# Patient Record
Sex: MALE | Race: WHITE | NOT HISPANIC OR LATINO
[De-identification: names, ages, dates, MRNs, and addresses within clinical notes are randomized per-mention and may not be internally consistent; named-entity substitution may affect disease eponyms.]

---

## 2020-09-23 PROBLEM — Z00.00 ENCOUNTER FOR PREVENTIVE HEALTH EXAMINATION: Status: ACTIVE | Noted: 2020-09-23

## 2020-10-14 ENCOUNTER — APPOINTMENT (OUTPATIENT)
Dept: GASTROENTEROLOGY | Facility: CLINIC | Age: 50
End: 2020-10-14
Payer: COMMERCIAL

## 2020-10-14 VITALS
BODY MASS INDEX: 25.92 KG/M2 | TEMPERATURE: 98 F | DIASTOLIC BLOOD PRESSURE: 89 MMHG | SYSTOLIC BLOOD PRESSURE: 127 MMHG | HEART RATE: 83 BPM | OXYGEN SATURATION: 98 % | WEIGHT: 175 LBS | HEIGHT: 69 IN

## 2020-10-14 DIAGNOSIS — Z82.3 FAMILY HISTORY OF STROKE: ICD-10-CM

## 2020-10-14 DIAGNOSIS — Z80.8 FAMILY HISTORY OF MALIGNANT NEOPLASM OF OTHER ORGANS OR SYSTEMS: ICD-10-CM

## 2020-10-14 DIAGNOSIS — A63.0 ANOGENITAL (VENEREAL) WARTS: ICD-10-CM

## 2020-10-14 DIAGNOSIS — Z12.11 ENCOUNTER FOR SCREENING FOR MALIGNANT NEOPLASM OF COLON: ICD-10-CM

## 2020-10-14 DIAGNOSIS — Z82.49 FAMILY HISTORY OF ISCHEMIC HEART DISEASE AND OTHER DISEASES OF THE CIRCULATORY SYSTEM: ICD-10-CM

## 2020-10-14 DIAGNOSIS — Z78.9 OTHER SPECIFIED HEALTH STATUS: ICD-10-CM

## 2020-10-14 DIAGNOSIS — F17.200 NICOTINE DEPENDENCE, UNSPECIFIED, UNCOMPLICATED: ICD-10-CM

## 2020-10-14 DIAGNOSIS — Z80.3 FAMILY HISTORY OF MALIGNANT NEOPLASM OF BREAST: ICD-10-CM

## 2020-10-14 PROCEDURE — 99203 OFFICE O/P NEW LOW 30 MIN: CPT

## 2020-10-14 NOTE — PHYSICAL EXAM
[General Appearance - Alert] : alert [Sclera] : the sclera and conjunctiva were normal [General Appearance - Well-Appearing] : healthy appearing [General Appearance - Well Nourished] : well nourished [Neck Appearance] : the appearance of the neck was normal [Outer Ear] : the ears and nose were normal in appearance [] : no respiratory distress [Abdomen Tenderness] : non-tender [Abdomen Mass (___ Cm)] : no abdominal mass palpated [No Focal Deficits] : no focal deficits [Involuntary Movements] : no involuntary movements were seen [Skin Color & Pigmentation] : normal skin color and pigmentation [Oriented To Time, Place, And Person] : oriented to person, place, and time

## 2020-10-14 NOTE — ASSESSMENT
[FreeTextEntry1] : 49 yo male with a hx of anal warts s/p tx here for a screening colonoscopy\par \par - Colonoscopy at [facility]\par - D/w pt regarding COVID testing pre-procedure as well as escort post-procedure\par - Risks of the procedure including bleeding, perforation, etc d/w the patient\par - Suprep split prep\par - Will refer to Dr. Green for anal pap evaluation

## 2020-10-14 NOTE — HISTORY OF PRESENT ILLNESS
[FreeTextEntry1] : 49 yo male here to arrange a screening colonoscopy.  No BRBPR, no dark stools.  No N/V.  Takes 2 teaspoons of Metamucil with four ounces of water daily to prevent diarrhea.  No constipation.  No heartburn or reflux -- takes a teaspoon of baking soda with four ounces of water very rarely to treat if symptoms.   No hx of an EGD or a colonoscopy.  Hx of anal warts s/p tx with a cream (Efudex) which eradicated them 25 years ago, no f/u since -- Dr. Thayer rx'ed of colorectal;. \par \par No famhx of gastric, colon or pancreatic cancer.  No IBD.  \par \par .

## 2020-12-08 DIAGNOSIS — Z01.812 ENCOUNTER FOR PREPROCEDURAL LABORATORY EXAMINATION: ICD-10-CM

## 2020-12-14 ENCOUNTER — RESULT REVIEW (OUTPATIENT)
Age: 50
End: 2020-12-14

## 2020-12-14 ENCOUNTER — OUTPATIENT (OUTPATIENT)
Dept: OUTPATIENT SERVICES | Facility: HOSPITAL | Age: 50
LOS: 1 days | Discharge: HOME | End: 2020-12-14
Payer: COMMERCIAL

## 2020-12-14 ENCOUNTER — APPOINTMENT (OUTPATIENT)
Dept: GASTROENTEROLOGY | Facility: HOSPITAL | Age: 50
End: 2020-12-14

## 2020-12-14 LAB — SARS-COV-2 N GENE NPH QL NAA+PROBE: NOT DETECTED

## 2020-12-14 PROCEDURE — 88305 TISSUE EXAM BY PATHOLOGIST: CPT

## 2020-12-14 PROCEDURE — 45380 COLONOSCOPY AND BIOPSY: CPT | Mod: GC

## 2020-12-14 PROCEDURE — 88305 TISSUE EXAM BY PATHOLOGIST: CPT | Mod: 26

## 2020-12-14 PROCEDURE — 45380 COLONOSCOPY AND BIOPSY: CPT | Mod: PT

## 2020-12-16 LAB — SURGICAL PATHOLOGY STUDY: SIGNIFICANT CHANGE UP

## 2022-03-18 ENCOUNTER — APPOINTMENT (OUTPATIENT)
Dept: OTOLARYNGOLOGY | Facility: CLINIC | Age: 52
End: 2022-03-18
Payer: COMMERCIAL

## 2022-03-18 ENCOUNTER — NON-APPOINTMENT (OUTPATIENT)
Age: 52
End: 2022-03-18

## 2022-03-18 VITALS — TEMPERATURE: 98.3 F | BODY MASS INDEX: 26.66 KG/M2 | WEIGHT: 180 LBS | HEIGHT: 69 IN

## 2022-03-18 DIAGNOSIS — H93.291 OTHER ABNORMAL AUDITORY PERCEPTIONS, RIGHT EAR: ICD-10-CM

## 2022-03-18 PROCEDURE — 95992 CANALITH REPOSITIONING PROC: CPT

## 2022-03-18 PROCEDURE — 99203 OFFICE O/P NEW LOW 30 MIN: CPT | Mod: 25

## 2022-03-18 NOTE — HISTORY OF PRESENT ILLNESS
[de-identified] : SE ESCOBAR is a 51 year old patient With a several month history of a "gurgling" noise in the right ear.  There was no preceding event.  He has no otalgia, otorrhea, or tinnitus (high-pitched or pulsatile).  He does not have eustachian tube dysfunction.  He has no nasal or throat symptoms.  He also had 2 episodes of dizziness.  He had one on Tuesday and then again Wednesday or Thursday.  He was in bed and turned.  He had a several second episode of a spinning sensation.  He has not been sleeping well.  He does have a history of migraines and neck pain.  Has been sleeping more on his back which has helped with the neck pain.  It does make him snore.  He denies TMJ dysfunction.  He does have occasional reflux.  He has been under a lot of stress recently.\par \par No history of recurrent ear infections, prior otologic surgery, or ear trauma.\par There is a family history of hearing loss and vertigo.\par He does have a history of noise exposure.  He is an  at [employer].\par He has annual audiograms.  His last one was a few months ago.\par

## 2022-03-18 NOTE — CONSULT LETTER
[Dear  ___] : Dear  [unfilled], [Consult Letter:] : I had the pleasure of evaluating your patient, [unfilled]. [Please see my note below.] : Please see my note below. [Consult Closing:] : Thank you very much for allowing me to participate in the care of this patient.  If you have any questions, please do not hesitate to contact me. [Sincerely,] : Sincerely, [FreeTextEntry3] : Alexandrea Ying MD\par

## 2022-03-18 NOTE — ASSESSMENT
[FreeTextEntry1] : He has what he describes as a gurgling noise in the right ear.  He denies tinnitus.  His ears were normal on exam.  He had a few stray hairs on the right side which were removed.  He is also had 2 episodes of dizziness.  He may have benign positional vertigo.  Chanel-Hallpike testing to the left was mildly positive.  Epley maneuver was performed\par \par PLAN\par \par -findings and management options discussed in detail with the patient. \par -good aural hygiene\par -avoid using cotton swabs in the ears\par -wax removal drops as needed. \par -noise precautions\par -I recommended an audiogram.  He declined.  I asked him to have a copy of his last one sent to me\par -He was given post Epley instructions.  If he has recurrent dizziness, he may try home exercises and consider vestibular therapy.  \par -We will discuss further work-up depending on how he does.\par -He is going to try and work on getting more sleep\par -follow up or call in 2 weeks with an update on his symptoms.\par -call and return earlier if any concerns.

## 2022-04-05 ENCOUNTER — APPOINTMENT (OUTPATIENT)
Dept: OTOLARYNGOLOGY | Facility: CLINIC | Age: 52
End: 2022-04-05
Payer: COMMERCIAL

## 2022-04-05 DIAGNOSIS — H90.5 UNSPECIFIED SENSORINEURAL HEARING LOSS: ICD-10-CM

## 2022-04-05 DIAGNOSIS — R42 DIZZINESS AND GIDDINESS: ICD-10-CM

## 2022-04-05 PROCEDURE — 99213 OFFICE O/P EST LOW 20 MIN: CPT

## 2022-04-05 NOTE — CONSULT LETTER
[Dear  ___] : Dear  [unfilled], [Courtesy Letter:] : I had the pleasure of seeing your patient, [unfilled], in my office today. [Please see my note below.] : Please see my note below. [Consult Closing:] : Thank you very much for allowing me to participate in the care of this patient.  If you have any questions, please do not hesitate to contact me. [Sincerely,] : Sincerely, [FreeTextEntry3] : Alexandrea Ying MD\par

## 2022-04-05 NOTE — ASSESSMENT
[FreeTextEntry1] : He is feeling better.  The dizziness has improved significantly.  I reviewed his prior audiograms.  He has an asymmetric left sensorineural hearing loss in the high frequencies.\par \par PLAN\par \par -findings and management options discussed in detail with the patient. \par -good aural hygiene\par -avoid using cotton swabs in the ears\par -wax removal drops as needed. \par -noise precautions\par -Annual audiogram\par -I discussed the possibility of retrocochlear pathology causing the asymmetric hearing loss.  He also has dizziness and migraines..  The options are observation with serial audiograms, ABR, and MRI to rule out retrocochlear pathology. The patient has opted for MRI\par -Home exercises or vestibular therapy as needed for dizziness\par -Neurology evaluation recommended for the neck pain and headaches\par -Follow-up or call for the MRI results..\par -call and return earlier if any concerns.

## 2022-04-05 NOTE — HISTORY OF PRESENT ILLNESS
[de-identified] : SE ESCOBAR is a 51 year old patient here for follow-up for a gurgling noise in the right ear and vertigo.  He feels a lot better..  The noise in the right ear has resolved.  The dizziness is also a lot better.  The Epley maneuver was performed at his last visit.  He has very light sensation of dizziness which is less frequent.  He has no other complaints.  He also has a history of neck pain which is better.  He brought his last 3 audiograms for me to review.  He has an asymmetric left sensorineural hearing loss.  He said it has been present for a while.  He has not had a work-up for it\par \par ENT history\par No history of recurrent ear infections, prior otologic surgery, or ear trauma.\par There is a family history of hearing loss and vertigo.\par He does have a history of noise exposure. He is an  at [employer].\par He has annual audiograms. His last one was a few months ago.\par He has chronic neck pain and migraines

## 2022-04-26 ENCOUNTER — OUTPATIENT (OUTPATIENT)
Dept: OUTPATIENT SERVICES | Facility: HOSPITAL | Age: 52
LOS: 1 days | End: 2022-04-26

## 2022-04-26 ENCOUNTER — APPOINTMENT (OUTPATIENT)
Dept: MRI IMAGING | Facility: CLINIC | Age: 52
End: 2022-04-26
Payer: COMMERCIAL

## 2022-04-26 PROCEDURE — 70553 MRI BRAIN STEM W/O & W/DYE: CPT | Mod: 26

## 2022-04-27 ENCOUNTER — NON-APPOINTMENT (OUTPATIENT)
Age: 52
End: 2022-04-27

## 2022-05-11 ENCOUNTER — NON-APPOINTMENT (OUTPATIENT)
Age: 52
End: 2022-05-11

## 2022-05-11 ENCOUNTER — APPOINTMENT (OUTPATIENT)
Dept: NEUROLOGY | Facility: CLINIC | Age: 52
End: 2022-05-11
Payer: COMMERCIAL

## 2022-05-11 VITALS
SYSTOLIC BLOOD PRESSURE: 112 MMHG | DIASTOLIC BLOOD PRESSURE: 72 MMHG | BODY MASS INDEX: 26.36 KG/M2 | WEIGHT: 178 LBS | TEMPERATURE: 97.2 F | HEART RATE: 66 BPM | HEIGHT: 69 IN | OXYGEN SATURATION: 97 %

## 2022-05-11 PROCEDURE — 99244 OFF/OP CNSLTJ NEW/EST MOD 40: CPT

## 2022-05-11 NOTE — ASSESSMENT
[FreeTextEntry1] : Patient has longstanding migraines with a strong family history of migraines as well and as is usually seen has stopped benefiting from over-the-counter medications.  He has never tried either a triptan or CGRP rescue medication.  I would like to start him on Nurtec for headaches as rescue medication.  At this point I am deferring prophylactic medication to see if effective and rapid treatment of his migraines reduces his frequency of headaches from 4-10 to under 4/month.  If that occurs we can keep him on only a rescue regimen.\par I have also asked him to keep a headache diary noting the frequency of headaches and the response to the Nurtec.\par \par follow up in 3 months.

## 2022-05-11 NOTE — CONSULT LETTER
[Dear  ___] : Dear ~FABRICIO, [Consult Letter:] : I had the pleasure of evaluating your patient, [unfilled]. [Please see my note below.] : Please see my note below. [Consult Closing:] : Thank you very much for allowing me to participate in the care of this patient.  If you have any questions, please do not hesitate to contact me. [Sincerely,] : Sincerely, [FreeTextEntry3] : Rut Pfeiffer MD\par Board Certified Neurology and Vascular Neurology\par Professor of Neurology\par [facility]\par [facility]\par [facility]\par \par [email or web address]\par [phone]\par

## 2022-05-11 NOTE — HISTORY OF PRESENT ILLNESS
[FreeTextEntry1] : Mr. Amos Antonio is referred today by Dr. Julio Hansen for evaluation and treatment of chronic migraine headaches.\par \par He first started having headaches in his late teens and while the headaches have fluctuated in frequency and intensity over the last 3+ decades and have remained persistent and severe in nature.  He has a strong family history with his mother and 1 sister experiencing severe migraines throughout the life.  He rarely gets an aura with visual scotomata that can occur with or without headache.  Amos reports that he has not been able  to discern specific food triggers in the past.  He does report that if he has neck pain it is often followed by a migraine and when he drinks either beer or wine, he has a migraine the next day.  He used to drink more frequently and to a greater degree in the past but now restricts it to 1 weekend day a week.  On those days, he tends to drink a sixpack of beer and/or a bottle of red wine.  The specific type of alcohol does not seem to matter vis-à-vis his triggers.  He does not drink coffee because it makes him jittery, but will have 1 cup of tea in the morning and that does not impact his headaches.  Finally stress at work and in his personal relationship were significant triggers that have subsequently improved in the last few months.\par \par As far as treatment, he used to take Tylenol or Advil and then more recently has been taking Excedrin tension.  Often he requires more than 1 dose every 6 hours before the headache resolves.  Headache duration can be anywhere from 1 hour to 12 hours.\par \sona Recently had an episode of vertigo that was resolved by an Epley maneuver by Dr. Ying.  An MRI IAC was also done that is negative for schwannoma, other mass lesions, inflammatory lesions, and strokes.  Of note, he does not usually get vertiginous with his migraines.\par \par Finally it appears that most of the neck pain occurs in the setting right before a migraine.\par \par \par Exercise and diet:\sona walks in the park usually 30 min a week. \sona joined a gym recently but has not gone yet. \sona bakes his own bread and mainly eats at home.\par \par Vapes tobacco. \par \par \par FH - mother and his oldest sister have migraines\par \par \par \par

## 2022-08-12 ENCOUNTER — APPOINTMENT (OUTPATIENT)
Dept: NEUROLOGY | Facility: CLINIC | Age: 52
End: 2022-08-12

## 2022-09-13 ENCOUNTER — RX RENEWAL (OUTPATIENT)
Age: 52
End: 2022-09-13

## 2022-09-26 ENCOUNTER — INPATIENT (INPATIENT)
Facility: HOSPITAL | Age: 52
LOS: 6 days | Discharge: HOME | End: 2022-10-03
Attending: SURGERY | Admitting: SURGERY

## 2022-09-26 VITALS
TEMPERATURE: 97 F | DIASTOLIC BLOOD PRESSURE: 76 MMHG | OXYGEN SATURATION: 99 % | RESPIRATION RATE: 18 BRPM | HEIGHT: 69 IN | HEART RATE: 89 BPM | WEIGHT: 175.05 LBS | SYSTOLIC BLOOD PRESSURE: 124 MMHG

## 2022-09-26 PROCEDURE — 99285 EMERGENCY DEPT VISIT HI MDM: CPT

## 2022-09-26 NOTE — ED ADULT TRIAGE NOTE - CHIEF COMPLAINT QUOTE
Patient complaining of testicular and anus soreness starting 3days go. Last BM this evening. Patient also reports lower abdominal pressure and tightness.

## 2022-09-27 ENCOUNTER — TRANSCRIPTION ENCOUNTER (OUTPATIENT)
Age: 52
End: 2022-09-27

## 2022-09-27 ENCOUNTER — RESULT REVIEW (OUTPATIENT)
Age: 52
End: 2022-09-27

## 2022-09-27 LAB
ALBUMIN SERPL ELPH-MCNC: 4.3 G/DL — SIGNIFICANT CHANGE UP (ref 3.5–5.2)
ALP SERPL-CCNC: 69 U/L — SIGNIFICANT CHANGE UP (ref 30–115)
ALT FLD-CCNC: 25 U/L — SIGNIFICANT CHANGE UP (ref 0–41)
ANION GAP SERPL CALC-SCNC: 15 MMOL/L — HIGH (ref 7–14)
APTT BLD: 29.6 SEC — SIGNIFICANT CHANGE UP (ref 27–39.2)
AST SERPL-CCNC: 15 U/L — SIGNIFICANT CHANGE UP (ref 0–41)
BASOPHILS # BLD AUTO: 0 K/UL — SIGNIFICANT CHANGE UP (ref 0–0.2)
BASOPHILS NFR BLD AUTO: 0 % — SIGNIFICANT CHANGE UP (ref 0–1)
BILIRUB DIRECT SERPL-MCNC: 0.7 MG/DL — HIGH (ref 0–0.3)
BILIRUB INDIRECT FLD-MCNC: 2.5 MG/DL — HIGH (ref 0.2–1.2)
BILIRUB SERPL-MCNC: 3.2 MG/DL — HIGH (ref 0.2–1.2)
BLD GP AB SCN SERPL QL: SIGNIFICANT CHANGE UP
BUN SERPL-MCNC: 11 MG/DL — SIGNIFICANT CHANGE UP (ref 10–20)
CALCIUM SERPL-MCNC: 9.7 MG/DL — SIGNIFICANT CHANGE UP (ref 8.4–10.5)
CHLORIDE SERPL-SCNC: 94 MMOL/L — LOW (ref 98–110)
CO2 SERPL-SCNC: 23 MMOL/L — SIGNIFICANT CHANGE UP (ref 17–32)
CREAT SERPL-MCNC: 0.9 MG/DL — SIGNIFICANT CHANGE UP (ref 0.7–1.5)
EGFR: 102 ML/MIN/1.73M2 — SIGNIFICANT CHANGE UP
EOSINOPHIL # BLD AUTO: 0 K/UL — SIGNIFICANT CHANGE UP (ref 0–0.7)
EOSINOPHIL NFR BLD AUTO: 0 % — SIGNIFICANT CHANGE UP (ref 0–8)
GLUCOSE SERPL-MCNC: 161 MG/DL — HIGH (ref 70–99)
HCT VFR BLD CALC: 42 % — SIGNIFICANT CHANGE UP (ref 42–52)
HGB BLD-MCNC: 14.6 G/DL — SIGNIFICANT CHANGE UP (ref 14–18)
IMM GRANULOCYTES NFR BLD AUTO: 0.1 % — SIGNIFICANT CHANGE UP (ref 0.1–0.3)
INR BLD: 1.23 RATIO — SIGNIFICANT CHANGE UP (ref 0.65–1.3)
LACTATE SERPL-SCNC: 3.5 MMOL/L — HIGH (ref 0.7–2)
LIDOCAIN IGE QN: 15 U/L — SIGNIFICANT CHANGE UP (ref 7–60)
LYMPHOCYTES # BLD AUTO: 0.49 K/UL — LOW (ref 1.2–3.4)
LYMPHOCYTES # BLD AUTO: 7.3 % — LOW (ref 20.5–51.1)
MCHC RBC-ENTMCNC: 31.1 PG — HIGH (ref 27–31)
MCHC RBC-ENTMCNC: 34.8 G/DL — SIGNIFICANT CHANGE UP (ref 32–37)
MCV RBC AUTO: 89.6 FL — SIGNIFICANT CHANGE UP (ref 80–94)
MONOCYTES # BLD AUTO: 0.57 K/UL — SIGNIFICANT CHANGE UP (ref 0.1–0.6)
MONOCYTES NFR BLD AUTO: 8.5 % — SIGNIFICANT CHANGE UP (ref 1.7–9.3)
NEUTROPHILS # BLD AUTO: 5.63 K/UL — SIGNIFICANT CHANGE UP (ref 1.4–6.5)
NEUTROPHILS NFR BLD AUTO: 84.1 % — HIGH (ref 42.2–75.2)
NRBC # BLD: 0 /100 WBCS — SIGNIFICANT CHANGE UP (ref 0–0)
PLATELET # BLD AUTO: 297 K/UL — SIGNIFICANT CHANGE UP (ref 130–400)
POTASSIUM SERPL-MCNC: 4.1 MMOL/L — SIGNIFICANT CHANGE UP (ref 3.5–5)
POTASSIUM SERPL-SCNC: 4.1 MMOL/L — SIGNIFICANT CHANGE UP (ref 3.5–5)
PROT SERPL-MCNC: 7 G/DL — SIGNIFICANT CHANGE UP (ref 6–8)
PROTHROM AB SERPL-ACNC: 14.1 SEC — HIGH (ref 9.95–12.87)
RBC # BLD: 4.69 M/UL — LOW (ref 4.7–6.1)
RBC # FLD: 12.2 % — SIGNIFICANT CHANGE UP (ref 11.5–14.5)
SARS-COV-2 RNA SPEC QL NAA+PROBE: SIGNIFICANT CHANGE UP
SODIUM SERPL-SCNC: 132 MMOL/L — LOW (ref 135–146)
WBC # BLD: 6.7 K/UL — SIGNIFICANT CHANGE UP (ref 4.8–10.8)
WBC # FLD AUTO: 6.7 K/UL — SIGNIFICANT CHANGE UP (ref 4.8–10.8)

## 2022-09-27 PROCEDURE — 71045 X-RAY EXAM CHEST 1 VIEW: CPT | Mod: 26

## 2022-09-27 PROCEDURE — 93010 ELECTROCARDIOGRAM REPORT: CPT

## 2022-09-27 PROCEDURE — 74177 CT ABD & PELVIS W/CONTRAST: CPT | Mod: 26,MA

## 2022-09-27 PROCEDURE — 99222 1ST HOSP IP/OBS MODERATE 55: CPT | Mod: 57

## 2022-09-27 PROCEDURE — 88304 TISSUE EXAM BY PATHOLOGIST: CPT | Mod: 26

## 2022-09-27 RX ORDER — OXYCODONE HYDROCHLORIDE 5 MG/1
5 TABLET ORAL EVERY 8 HOURS
Refills: 0 | Status: DISCONTINUED | OUTPATIENT
Start: 2022-09-27 | End: 2022-09-27

## 2022-09-27 RX ORDER — MORPHINE SULFATE 50 MG/1
4 CAPSULE, EXTENDED RELEASE ORAL ONCE
Refills: 0 | Status: DISCONTINUED | OUTPATIENT
Start: 2022-09-27 | End: 2022-09-27

## 2022-09-27 RX ORDER — PIPERACILLIN AND TAZOBACTAM 4; .5 G/20ML; G/20ML
3.38 INJECTION, POWDER, LYOPHILIZED, FOR SOLUTION INTRAVENOUS EVERY 6 HOURS
Refills: 0 | Status: DISCONTINUED | OUTPATIENT
Start: 2022-09-27 | End: 2022-09-27

## 2022-09-27 RX ORDER — KETOROLAC TROMETHAMINE 30 MG/ML
15 SYRINGE (ML) INJECTION ONCE
Refills: 0 | Status: DISCONTINUED | OUTPATIENT
Start: 2022-09-27 | End: 2022-09-27

## 2022-09-27 RX ORDER — ENOXAPARIN SODIUM 100 MG/ML
40 INJECTION SUBCUTANEOUS EVERY 24 HOURS
Refills: 0 | Status: DISCONTINUED | OUTPATIENT
Start: 2022-09-27 | End: 2022-09-27

## 2022-09-27 RX ORDER — ACETAMINOPHEN 500 MG
650 TABLET ORAL EVERY 6 HOURS
Refills: 0 | Status: DISCONTINUED | OUTPATIENT
Start: 2022-09-27 | End: 2022-09-27

## 2022-09-27 RX ORDER — IBUPROFEN 200 MG
600 TABLET ORAL EVERY 6 HOURS
Refills: 0 | Status: DISCONTINUED | OUTPATIENT
Start: 2022-09-27 | End: 2022-09-27

## 2022-09-27 RX ORDER — PIPERACILLIN AND TAZOBACTAM 4; .5 G/20ML; G/20ML
3.38 INJECTION, POWDER, LYOPHILIZED, FOR SOLUTION INTRAVENOUS ONCE
Refills: 0 | Status: COMPLETED | OUTPATIENT
Start: 2022-09-27 | End: 2022-09-27

## 2022-09-27 RX ORDER — SODIUM CHLORIDE 9 MG/ML
1000 INJECTION, SOLUTION INTRAVENOUS
Refills: 0 | Status: DISCONTINUED | OUTPATIENT
Start: 2022-09-27 | End: 2022-09-27

## 2022-09-27 RX ORDER — SODIUM CHLORIDE 9 MG/ML
1000 INJECTION INTRAMUSCULAR; INTRAVENOUS; SUBCUTANEOUS ONCE
Refills: 0 | Status: COMPLETED | OUTPATIENT
Start: 2022-09-27 | End: 2022-09-27

## 2022-09-27 RX ORDER — CEFOTETAN DISODIUM 1 G
2 VIAL (EA) INJECTION ONCE
Refills: 0 | Status: COMPLETED | OUTPATIENT
Start: 2022-09-27 | End: 2022-09-27

## 2022-09-27 RX ORDER — SODIUM CHLORIDE 9 MG/ML
1000 INJECTION, SOLUTION INTRAVENOUS ONCE
Refills: 0 | Status: COMPLETED | OUTPATIENT
Start: 2022-09-27 | End: 2022-09-27

## 2022-09-27 RX ORDER — PIPERACILLIN AND TAZOBACTAM 4; .5 G/20ML; G/20ML
3.38 INJECTION, POWDER, LYOPHILIZED, FOR SOLUTION INTRAVENOUS ONCE
Refills: 0 | Status: DISCONTINUED | OUTPATIENT
Start: 2022-09-27 | End: 2022-09-27

## 2022-09-27 RX ADMIN — PIPERACILLIN AND TAZOBACTAM 25 GRAM(S): 4; .5 INJECTION, POWDER, LYOPHILIZED, FOR SOLUTION INTRAVENOUS at 17:53

## 2022-09-27 RX ADMIN — Medication 15 MILLIGRAM(S): at 05:44

## 2022-09-27 RX ADMIN — Medication 15 MILLIGRAM(S): at 02:28

## 2022-09-27 RX ADMIN — SODIUM CHLORIDE 1000 MILLILITER(S): 9 INJECTION, SOLUTION INTRAVENOUS at 08:55

## 2022-09-27 RX ADMIN — SODIUM CHLORIDE 125 MILLILITER(S): 9 INJECTION, SOLUTION INTRAVENOUS at 16:28

## 2022-09-27 RX ADMIN — MORPHINE SULFATE 4 MILLIGRAM(S): 50 CAPSULE, EXTENDED RELEASE ORAL at 08:56

## 2022-09-27 RX ADMIN — SODIUM CHLORIDE 1000 MILLILITER(S): 9 INJECTION INTRAMUSCULAR; INTRAVENOUS; SUBCUTANEOUS at 05:40

## 2022-09-27 RX ADMIN — SODIUM CHLORIDE 1000 MILLILITER(S): 9 INJECTION INTRAMUSCULAR; INTRAVENOUS; SUBCUTANEOUS at 02:30

## 2022-09-27 RX ADMIN — PIPERACILLIN AND TAZOBACTAM 200 GRAM(S): 4; .5 INJECTION, POWDER, LYOPHILIZED, FOR SOLUTION INTRAVENOUS at 12:19

## 2022-09-27 RX ADMIN — Medication 100 GRAM(S): at 05:29

## 2022-09-27 RX ADMIN — OXYCODONE HYDROCHLORIDE 5 MILLIGRAM(S): 5 TABLET ORAL at 12:41

## 2022-09-27 RX ADMIN — Medication 650 MILLIGRAM(S): at 17:53

## 2022-09-27 RX ADMIN — MORPHINE SULFATE 4 MILLIGRAM(S): 50 CAPSULE, EXTENDED RELEASE ORAL at 04:09

## 2022-09-27 NOTE — ED PROVIDER NOTE - PHYSICAL EXAMINATION
VITAL SIGNS: I have reviewed nursing notes and confirm.  CONSTITUTIONAL: 51 yo M laying on stretcher; in mild distress.  SKIN: Skin exam is warm and dry, no acute rash.  HEAD: Normocephalic; atraumatic.  EYES: PERRL, EOM intact; conjunctiva and sclera clear.  ENT: No nasal discharge; airway clear.   CARD: S1, S2 normal; no murmurs, gallops, or rubs. Regular rate and rhythm.  RESP: No wheezes, rales or rhonchi. Speaking in full sentences.   ABD: Normal bowel sounds; soft; non-distended; (+) lower abd TTP; No rebound or guarding. No CVA tenderness.  EXT: Normal ROM. No clubbing, cyanosis or edema.   NEURO: Alert, oriented. Grossly unremarkable. No focal deficits.

## 2022-09-27 NOTE — H&P ADULT - NSHPPHYSICALEXAM_GEN_ALL_CORE
VITALS:  T(F): 96.6 (09-26-22 @ 23:58), Max: 96.6 (09-26-22 @ 23:58)  HR: 89 (09-26-22 @ 23:58) (89 - 89)  BP: 124/76 (09-26-22 @ 23:58) (124/76 - 124/76)  RR: 18 (09-26-22 @ 23:58) (18 - 18)  SpO2: 99% (09-26-22 @ 23:58) (99% - 99%)    PHYSICAL EXAM:  General: NAD, AAOx3  Cardiac: RRR  Respiratory: Unlabored breathing at rest  Abdomen: Soft, distended, tender to palpation in all quadrants with rebound tenderness, peritoniti  Musculoskeletal: Strength 5/5 BL UE/LE, ROM intact, compartments soft  Neuro: Sensation grossly intact and equal throughout, no focal deficits  Skin: Warm/dry, normal color, no jaundice

## 2022-09-27 NOTE — ED PROVIDER NOTE - OBJECTIVE STATEMENT
51 yo M with no significant PMHx, present sot the Ed c/o moderate lower abdominal pain x 3 days with worsening of symptoms tonight. Pain is sharp, radiates into groin and is associated with urinary urgency. He denies other complaints. Pt denies fever, chills, nausea, vomiting, diarrhea, headache, dizziness, weakness, chest pain, SOB, back pain, LOC, trauma, cough, calf pain/swelling, recent travel, recent surgery.

## 2022-09-27 NOTE — ED PROVIDER NOTE - NS ED ROS FT
Review of Systems  Constitutional:  No fever, chills.  Eyes:  No visual changes, eye pain, or discharge.  ENMT:  No hearing changes, pain, or discharge. No nasal congestion, discharge, or bleeding. No throat pain, swelling, or difficulty swallowing.  Cardiac:  No chest pain, palpitations, syncope, or edema.  Respiratory:  No dyspnea, cough. No hemoptysis.  GI:  No nausea, vomiting, diarrhea. (+) abd pain  :  (+) frequency  MS:  No back pain.  Skin:  No skin rash, pruritis, jaundice, or lesions.  Neuro:  No headache, dizziness, loss of sensation, or focal weakness.  No change in mental status.

## 2022-09-27 NOTE — ED PROVIDER NOTE - NS ED ATTENDING STATEMENT MOD
This was a shared visit with the KRYSTIN. I reviewed and verified the documentation and independently performed the documented:

## 2022-09-27 NOTE — ED ADULT NURSE REASSESSMENT NOTE - NS ED NURSE REASSESS COMMENT FT1
Pt awake, alert and in no distress. C/o abdominal pain. Pain medication given as ordered. IV intact. Safety maintained.

## 2022-09-27 NOTE — H&P ADULT - ATTENDING COMMENTS
This is 53y/o male w/ no PMH/PSH who presents with 3-4 days of acute abdominal pain, initially in the lower quadrants but as of this time more diffuse in all quadrants.  Denies associated N/V.  Has reduced PO intake.  Last BM/flatus 2 days ago.  Describes pain as crampy abdominal pain that radiates to his back.  Reports normal colonoscopy done one year ago.     PE:  AAO x3  Chest: clear.  CV : rrr  Abdomen: tender in the lower quadrants with rebound.    CT was reviewed and is consistent with acute appendicitis.    ASSESSMENT:  53 y/o male with Acute Appendicitis with Localized Peritonitis.    PLAN:  - NPO, IVF, iv Zosyn  Will book for urgent Laparoscopic Appendectomy, Possible Open.    Informed consent was obtained from the patient after explaining all the risks and benefits of the procedure including but not limited to infection, bleeding and etc. He understood and agreed. All questions were answered.

## 2022-09-27 NOTE — H&P ADULT - HISTORY OF PRESENT ILLNESS
SURGERY H&P    Patient: SE ESCOBAR , 52y ([birth year])Male   MRN: [id number]  Location: [facility] ER Hold 059 A  Visit: 09-27-22 Inpatient  Date: 09-27-22 @ 12:43    HPI: 52M w/ no PMH/PSH who presents with 3-4 days of acute abdominal pain, initially in the lower quadrants but as of this time more diffuse in all quadrants.  Denies associated N/V.  Has reduced PO intake.  Last BM/flatus 2 days ago.  Describes pain as crampy abdominal pain that radiates to his back.  Reports normal colonoscopy done one year ago.  Seen bedside in ED, AVSS, NAD, AAOx3, conversational.  Abdomen soft, distended, tender to palpation in all quadrants with rebound tenderness, peritonitic.  WBC 6.  CT findings consistent with perforated appendicitis with appendicolith.  Started on IVF, IV Zosyn in the ED, preop labs sent.            PAST MEDICAL & SURGICAL HISTORY:      Home Medications:    MEDICATIONS  (STANDING):  enoxaparin Injectable 40 milliGRAM(s) SubCutaneous every 24 hours  lactated ringers. 1000 milliLiter(s) (125 mL/Hr) IV Continuous <Continuous>  piperacillin/tazobactam IVPB.. 3.375 Gram(s) IV Intermittent every 6 hours    MEDICATIONS  (PRN):  acetaminophen     Tablet .. 650 milliGRAM(s) Oral every 6 hours PRN Mild Pain (1 - 3)  ibuprofen  Tablet. 600 milliGRAM(s) Oral every 6 hours PRN Mild Pain (1 - 3)  oxyCODONE    IR 5 milliGRAM(s) Oral every 8 hours PRN Severe Pain (7 - 10)

## 2022-09-27 NOTE — H&P ADULT - NSHPLABSRESULTS_GEN_ALL_CORE
LAB/STUDIES:                        14.6   6.70  )-----------( 297      ( 27 Sep 2022 01:39 )             42.0     09-27    132<L>  |  94<L>  |  11  ----------------------------<  161<H>  4.1   |  23  |  0.9    Ca    9.7      27 Sep 2022 01:39    TPro  7.0  /  Alb  4.3  /  TBili  3.2<H>  /  DBili  0.7<H>  /  AST  15  /  ALT  25  /  AlkPhos  69  09-27    PT/INR - ( 27 Sep 2022 11:17 )   PT: 14.10 sec;   INR: 1.23 ratio         PTT - ( 27 Sep 2022 11:17 )  PTT:29.6 sec  LIVER FUNCTIONS - ( 27 Sep 2022 01:39 )  Alb: 4.3 g/dL / Pro: 7.0 g/dL / ALK PHOS: 69 U/L / ALT: 25 U/L / AST: 15 U/L / GGT: x                         IMAGING:  < from: CT Abdomen and Pelvis w/ IV Cont (09.27.22 @ 03:38) >    PERITONEUM/MESENTERY/BOWEL: There is a wall thickened dilated appendix to   approximately 2 cm with multiple appendicoliths at the appendiceal base   measuring up to 1 cm with surrounding fat inflammation and phlegmon.   There are a few scattered punctate locules of air in the right   hemiabdomen consistent with perforation, suspect perforated site is at   the appendiceal base. Small amount of free pelvic fluid. No evidence for   abscess formation.    BONES/SOFT TISSUES: Degenerative changes of the spine.    OTHER: Normal caliber abdominal aorta.      IMPRESSION:    Acute perforated appendicitis as detailed above. No abscess formation.    Additional findings of perforation discussed with Jordy Watkins at 5:49 AM    --- End of Report ---    < end of copied text >

## 2022-09-27 NOTE — H&P ADULT - ASSESSMENT
52M w/ no PMH/PSH who presents with clinical and radiographic findings consistent with acute, perforated appendicitis    Plan:   -Admit to surgical service   -Added on to OR for laparoscopic appendectomy, possible open, all indicated procedures   -Written consent obtained   -NPO, IVF   -IV Zosyn   -Discussed with surgical attending

## 2022-09-27 NOTE — ED PROVIDER NOTE - ATTENDING APP SHARED VISIT CONTRIBUTION OF CARE
bl lower ab pain for 3 days. assoc with urinary urgency. no fever, chills, flank pain, cp, sob. no hematuria.  pain cramplike, non radiating.    pt in nad, anict, ctab, rrr, ab soft, b/l LQ tender. no rebound. no cvat.

## 2022-09-28 LAB
ALBUMIN SERPL ELPH-MCNC: 3 G/DL — LOW (ref 3.5–5.2)
ALBUMIN SERPL ELPH-MCNC: 3.1 G/DL — LOW (ref 3.5–5.2)
ALP SERPL-CCNC: 55 U/L — SIGNIFICANT CHANGE UP (ref 30–115)
ALP SERPL-CCNC: 55 U/L — SIGNIFICANT CHANGE UP (ref 30–115)
ALT FLD-CCNC: 16 U/L — SIGNIFICANT CHANGE UP (ref 0–41)
ALT FLD-CCNC: 22 U/L — SIGNIFICANT CHANGE UP (ref 0–41)
ANION GAP SERPL CALC-SCNC: 10 MMOL/L — SIGNIFICANT CHANGE UP (ref 7–14)
ANION GAP SERPL CALC-SCNC: 10 MMOL/L — SIGNIFICANT CHANGE UP (ref 7–14)
AST SERPL-CCNC: 16 U/L — SIGNIFICANT CHANGE UP (ref 0–41)
AST SERPL-CCNC: 25 U/L — SIGNIFICANT CHANGE UP (ref 0–41)
BASOPHILS # BLD AUTO: 0.02 K/UL — SIGNIFICANT CHANGE UP (ref 0–0.2)
BASOPHILS # BLD AUTO: 0.03 K/UL — SIGNIFICANT CHANGE UP (ref 0–0.2)
BASOPHILS NFR BLD AUTO: 0.1 % — SIGNIFICANT CHANGE UP (ref 0–1)
BASOPHILS NFR BLD AUTO: 0.2 % — SIGNIFICANT CHANGE UP (ref 0–1)
BILIRUB DIRECT SERPL-MCNC: 3.4 MG/DL — HIGH (ref 0–0.3)
BILIRUB DIRECT SERPL-MCNC: 4.1 MG/DL — HIGH (ref 0–0.3)
BILIRUB INDIRECT FLD-MCNC: 2.3 MG/DL — HIGH (ref 0.2–1.2)
BILIRUB INDIRECT FLD-MCNC: 2.4 MG/DL — HIGH (ref 0.2–1.2)
BILIRUB SERPL-MCNC: 5.8 MG/DL — HIGH (ref 0.2–1.2)
BILIRUB SERPL-MCNC: 6.4 MG/DL — HIGH (ref 0.2–1.2)
BUN SERPL-MCNC: 11 MG/DL — SIGNIFICANT CHANGE UP (ref 10–20)
BUN SERPL-MCNC: 11 MG/DL — SIGNIFICANT CHANGE UP (ref 10–20)
CALCIUM SERPL-MCNC: 8.8 MG/DL — SIGNIFICANT CHANGE UP (ref 8.4–10.5)
CALCIUM SERPL-MCNC: 9.3 MG/DL — SIGNIFICANT CHANGE UP (ref 8.4–10.5)
CHLORIDE SERPL-SCNC: 95 MMOL/L — LOW (ref 98–110)
CHLORIDE SERPL-SCNC: 98 MMOL/L — SIGNIFICANT CHANGE UP (ref 98–110)
CO2 SERPL-SCNC: 25 MMOL/L — SIGNIFICANT CHANGE UP (ref 17–32)
CO2 SERPL-SCNC: 26 MMOL/L — SIGNIFICANT CHANGE UP (ref 17–32)
CREAT SERPL-MCNC: 0.9 MG/DL — SIGNIFICANT CHANGE UP (ref 0.7–1.5)
CREAT SERPL-MCNC: 1 MG/DL — SIGNIFICANT CHANGE UP (ref 0.7–1.5)
EGFR: 102 ML/MIN/1.73M2 — SIGNIFICANT CHANGE UP
EGFR: 90 ML/MIN/1.73M2 — SIGNIFICANT CHANGE UP
EOSINOPHIL # BLD AUTO: 0 K/UL — SIGNIFICANT CHANGE UP (ref 0–0.7)
EOSINOPHIL # BLD AUTO: 0 K/UL — SIGNIFICANT CHANGE UP (ref 0–0.7)
EOSINOPHIL NFR BLD AUTO: 0 % — SIGNIFICANT CHANGE UP (ref 0–8)
EOSINOPHIL NFR BLD AUTO: 0 % — SIGNIFICANT CHANGE UP (ref 0–8)
GLUCOSE SERPL-MCNC: 111 MG/DL — HIGH (ref 70–99)
GLUCOSE SERPL-MCNC: 111 MG/DL — HIGH (ref 70–99)
HCT VFR BLD CALC: 33.7 % — LOW (ref 42–52)
HCT VFR BLD CALC: 34.8 % — LOW (ref 42–52)
HGB BLD-MCNC: 11.6 G/DL — LOW (ref 14–18)
HGB BLD-MCNC: 12.1 G/DL — LOW (ref 14–18)
IMM GRANULOCYTES NFR BLD AUTO: 1.1 % — HIGH (ref 0.1–0.3)
IMM GRANULOCYTES NFR BLD AUTO: 1.3 % — HIGH (ref 0.1–0.3)
LYMPHOCYTES # BLD AUTO: 0.51 K/UL — LOW (ref 1.2–3.4)
LYMPHOCYTES # BLD AUTO: 0.71 K/UL — LOW (ref 1.2–3.4)
LYMPHOCYTES # BLD AUTO: 3.4 % — LOW (ref 20.5–51.1)
LYMPHOCYTES # BLD AUTO: 4.6 % — LOW (ref 20.5–51.1)
MAGNESIUM SERPL-MCNC: 1.8 MG/DL — SIGNIFICANT CHANGE UP (ref 1.8–2.4)
MAGNESIUM SERPL-MCNC: 1.8 MG/DL — SIGNIFICANT CHANGE UP (ref 1.8–2.4)
MCHC RBC-ENTMCNC: 31.8 PG — HIGH (ref 27–31)
MCHC RBC-ENTMCNC: 31.9 PG — HIGH (ref 27–31)
MCHC RBC-ENTMCNC: 34.4 G/DL — SIGNIFICANT CHANGE UP (ref 32–37)
MCHC RBC-ENTMCNC: 34.8 G/DL — SIGNIFICANT CHANGE UP (ref 32–37)
MCV RBC AUTO: 91.8 FL — SIGNIFICANT CHANGE UP (ref 80–94)
MCV RBC AUTO: 92.3 FL — SIGNIFICANT CHANGE UP (ref 80–94)
MONOCYTES # BLD AUTO: 0.9 K/UL — HIGH (ref 0.1–0.6)
MONOCYTES # BLD AUTO: 0.92 K/UL — HIGH (ref 0.1–0.6)
MONOCYTES NFR BLD AUTO: 5.9 % — SIGNIFICANT CHANGE UP (ref 1.7–9.3)
MONOCYTES NFR BLD AUTO: 6 % — SIGNIFICANT CHANGE UP (ref 1.7–9.3)
NEUTROPHILS # BLD AUTO: 13.39 K/UL — HIGH (ref 1.4–6.5)
NEUTROPHILS # BLD AUTO: 13.75 K/UL — HIGH (ref 1.4–6.5)
NEUTROPHILS NFR BLD AUTO: 88.3 % — HIGH (ref 42.2–75.2)
NEUTROPHILS NFR BLD AUTO: 89.1 % — HIGH (ref 42.2–75.2)
NRBC # BLD: 0 /100 WBCS — SIGNIFICANT CHANGE UP (ref 0–0)
NRBC # BLD: 0 /100 WBCS — SIGNIFICANT CHANGE UP (ref 0–0)
PHOSPHATE SERPL-MCNC: 1.6 MG/DL — LOW (ref 2.1–4.9)
PHOSPHATE SERPL-MCNC: 2.1 MG/DL — SIGNIFICANT CHANGE UP (ref 2.1–4.9)
PLATELET # BLD AUTO: 252 K/UL — SIGNIFICANT CHANGE UP (ref 130–400)
PLATELET # BLD AUTO: 270 K/UL — SIGNIFICANT CHANGE UP (ref 130–400)
POTASSIUM SERPL-MCNC: 4.1 MMOL/L — SIGNIFICANT CHANGE UP (ref 3.5–5)
POTASSIUM SERPL-MCNC: 4.7 MMOL/L — SIGNIFICANT CHANGE UP (ref 3.5–5)
POTASSIUM SERPL-SCNC: 4.1 MMOL/L — SIGNIFICANT CHANGE UP (ref 3.5–5)
POTASSIUM SERPL-SCNC: 4.7 MMOL/L — SIGNIFICANT CHANGE UP (ref 3.5–5)
PROT SERPL-MCNC: 5.3 G/DL — LOW (ref 6–8)
PROT SERPL-MCNC: 5.5 G/DL — LOW (ref 6–8)
RBC # BLD: 3.65 M/UL — LOW (ref 4.7–6.1)
RBC # BLD: 3.79 M/UL — LOW (ref 4.7–6.1)
RBC # FLD: 12.8 % — SIGNIFICANT CHANGE UP (ref 11.5–14.5)
RBC # FLD: 12.9 % — SIGNIFICANT CHANGE UP (ref 11.5–14.5)
SODIUM SERPL-SCNC: 130 MMOL/L — LOW (ref 135–146)
SODIUM SERPL-SCNC: 134 MMOL/L — LOW (ref 135–146)
WBC # BLD: 15.03 K/UL — HIGH (ref 4.8–10.8)
WBC # BLD: 15.57 K/UL — HIGH (ref 4.8–10.8)
WBC # FLD AUTO: 15.03 K/UL — HIGH (ref 4.8–10.8)
WBC # FLD AUTO: 15.57 K/UL — HIGH (ref 4.8–10.8)

## 2022-09-28 PROCEDURE — 44970 LAPAROSCOPY APPENDECTOMY: CPT

## 2022-09-28 RX ORDER — ONDANSETRON 8 MG/1
4 TABLET, FILM COATED ORAL ONCE
Refills: 0 | Status: DISCONTINUED | OUTPATIENT
Start: 2022-09-28 | End: 2022-09-28

## 2022-09-28 RX ORDER — ACETAMINOPHEN 500 MG
1000 TABLET ORAL EVERY 6 HOURS
Refills: 0 | Status: DISCONTINUED | OUTPATIENT
Start: 2022-09-28 | End: 2022-09-28

## 2022-09-28 RX ORDER — PIPERACILLIN AND TAZOBACTAM 4; .5 G/20ML; G/20ML
3.38 INJECTION, POWDER, LYOPHILIZED, FOR SOLUTION INTRAVENOUS EVERY 8 HOURS
Refills: 0 | Status: DISCONTINUED | OUTPATIENT
Start: 2022-09-28 | End: 2022-10-03

## 2022-09-28 RX ORDER — ENOXAPARIN SODIUM 100 MG/ML
40 INJECTION SUBCUTANEOUS EVERY 24 HOURS
Refills: 0 | Status: DISCONTINUED | OUTPATIENT
Start: 2022-09-28 | End: 2022-10-03

## 2022-09-28 RX ORDER — SODIUM CHLORIDE 9 MG/ML
1000 INJECTION, SOLUTION INTRAVENOUS
Refills: 0 | Status: DISCONTINUED | OUTPATIENT
Start: 2022-09-28 | End: 2022-09-30

## 2022-09-28 RX ORDER — IBUPROFEN 200 MG
400 TABLET ORAL EVERY 8 HOURS
Refills: 0 | Status: DISCONTINUED | OUTPATIENT
Start: 2022-09-28 | End: 2022-10-03

## 2022-09-28 RX ORDER — ACETAMINOPHEN 500 MG
650 TABLET ORAL EVERY 6 HOURS
Refills: 0 | Status: DISCONTINUED | OUTPATIENT
Start: 2022-09-28 | End: 2022-09-28

## 2022-09-28 RX ORDER — HYDROMORPHONE HYDROCHLORIDE 2 MG/ML
1 INJECTION INTRAMUSCULAR; INTRAVENOUS; SUBCUTANEOUS
Refills: 0 | Status: DISCONTINUED | OUTPATIENT
Start: 2022-09-28 | End: 2022-09-28

## 2022-09-28 RX ORDER — OXYCODONE HYDROCHLORIDE 5 MG/1
5 TABLET ORAL EVERY 4 HOURS
Refills: 0 | Status: DISCONTINUED | OUTPATIENT
Start: 2022-09-28 | End: 2022-10-03

## 2022-09-28 RX ORDER — HYDROMORPHONE HYDROCHLORIDE 2 MG/ML
0.5 INJECTION INTRAMUSCULAR; INTRAVENOUS; SUBCUTANEOUS
Refills: 0 | Status: DISCONTINUED | OUTPATIENT
Start: 2022-09-28 | End: 2022-09-28

## 2022-09-28 RX ORDER — SODIUM CHLORIDE 9 MG/ML
1000 INJECTION, SOLUTION INTRAVENOUS
Refills: 0 | Status: DISCONTINUED | OUTPATIENT
Start: 2022-09-28 | End: 2022-09-28

## 2022-09-28 RX ORDER — HYDROMORPHONE HYDROCHLORIDE 2 MG/ML
0.5 INJECTION INTRAMUSCULAR; INTRAVENOUS; SUBCUTANEOUS
Refills: 0 | Status: DISCONTINUED | OUTPATIENT
Start: 2022-09-28 | End: 2022-09-30

## 2022-09-28 RX ORDER — MEPERIDINE HYDROCHLORIDE 50 MG/ML
12.5 INJECTION INTRAMUSCULAR; INTRAVENOUS; SUBCUTANEOUS
Refills: 0 | Status: DISCONTINUED | OUTPATIENT
Start: 2022-09-28 | End: 2022-09-28

## 2022-09-28 RX ORDER — SENNA PLUS 8.6 MG/1
2 TABLET ORAL AT BEDTIME
Refills: 0 | Status: DISCONTINUED | OUTPATIENT
Start: 2022-09-28 | End: 2022-10-03

## 2022-09-28 RX ADMIN — PIPERACILLIN AND TAZOBACTAM 25 GRAM(S): 4; .5 INJECTION, POWDER, LYOPHILIZED, FOR SOLUTION INTRAVENOUS at 05:05

## 2022-09-28 RX ADMIN — PIPERACILLIN AND TAZOBACTAM 25 GRAM(S): 4; .5 INJECTION, POWDER, LYOPHILIZED, FOR SOLUTION INTRAVENOUS at 13:14

## 2022-09-28 RX ADMIN — SENNA PLUS 2 TABLET(S): 8.6 TABLET ORAL at 23:50

## 2022-09-28 RX ADMIN — Medication 400 MILLIGRAM(S): at 21:06

## 2022-09-28 RX ADMIN — PIPERACILLIN AND TAZOBACTAM 25 GRAM(S): 4; .5 INJECTION, POWDER, LYOPHILIZED, FOR SOLUTION INTRAVENOUS at 21:05

## 2022-09-28 RX ADMIN — ENOXAPARIN SODIUM 40 MILLIGRAM(S): 100 INJECTION SUBCUTANEOUS at 17:50

## 2022-09-28 RX ADMIN — OXYCODONE HYDROCHLORIDE 5 MILLIGRAM(S): 5 TABLET ORAL at 21:05

## 2022-09-28 RX ADMIN — SODIUM CHLORIDE 125 MILLILITER(S): 9 INJECTION, SOLUTION INTRAVENOUS at 09:12

## 2022-09-28 RX ADMIN — SODIUM CHLORIDE 125 MILLILITER(S): 9 INJECTION, SOLUTION INTRAVENOUS at 01:27

## 2022-09-28 NOTE — PATIENT PROFILE ADULT - FUNCTIONAL ASSESSMENT - BASIC MOBILITY 3.
A (Mesh Aigisrx [id number] [id number])  generator was used and lead connections to the device generator were made, and device parameters were equally acceptable and stable.  The device and leads were then placed within the pocket. Ppm re-connected to leads, tyrex pouch used    4 = No assist / stand by assistance

## 2022-09-28 NOTE — PROGRESS NOTE ADULT - SUBJECTIVE AND OBJECTIVE BOX
GENERAL SURGERY PROGRESS NOTE    Patient: SE ESCOBAR , 52y ([birth year])Male   MRN: [id number]  Location: [address]  Visit: 09-27-22 Inpatient  Date: 09-28-22 @ 08:19    Hospital Day #: 2  Post-Op Day #: 0    Procedure/Dx/Injuries: S/P laparoscopic appendectomy    Events of past 24 hours: Patient had a laparoscopic appendectomy performed where a gangrenous, perforated appendicitis with feculent peritonitis was found. Patient was washed out this AM. NPO on LR at 125. CYNTHIA drain in RLQ and mccormack in place. Post -op check was done. Patient in minimal pain from incisions. No flatus or bowel movements as of yet.    PAST MEDICAL & SURGICAL HISTORY:      Vitals:   T(F): 97.4 (09-28-22 @ 03:47), Max: 100.3 (09-27-22 @ 16:34)  HR: 75 (09-28-22 @ 03:47)  BP: 131/83 (09-28-22 @ 03:47)  RR: 18 (09-28-22 @ 03:47)  SpO2: 97% (09-28-22 @ 03:47)      Diet, NPO:   Except Medications     Special Instructions for Nursing:  Except Medications      Fluids: lactated ringers.: Solution, 1000 milliLiter(s) infuse at 125 mL/Hr      I & O's:    09-27-22 @ 07:01  -  09-28-22 @ 07:00  --------------------------------------------------------  IN:    Lactated Ringers: 125 mL  Total IN: 125 mL    OUT:    Drain (mL): 140 mL    Indwelling Catheter - Urethral (mL): 600 mL  Total OUT: 740 mL    Total NET: -615 mL        Bowel Movement: : [] YES [x] NO  Flatus: : [] YES [x] NO    PHYSICAL EXAM:  General: NAD, AAOx3, calm and cooperative  HEENT: Trachea ML  Cardiac: S1, S2  Respiratory: Normal respiratory effort  Abdomen: CYNTHIA drain. Mild incisional TTP. Soft, non-distended, no rebound, no guarding.   Skin: Warm/dry, normal color, no jaundice  Incision/wound: Dressings in place, clean, dry and intact    MEDICATIONS  (STANDING):  enoxaparin Injectable 40 milliGRAM(s) SubCutaneous every 24 hours  lactated ringers. 1000 milliLiter(s) (125 mL/Hr) IV Continuous <Continuous>  piperacillin/tazobactam IVPB.. 3.375 Gram(s) IV Intermittent every 8 hours    MEDICATIONS  (PRN):  acetaminophen     Tablet .. 1000 milliGRAM(s) Oral every 6 hours PRN Mild Pain (1 - 3)  HYDROmorphone  Injectable 0.5 milliGRAM(s) IV Push every 10 minutes PRN Moderate Pain (4 - 6)  oxyCODONE    IR 5 milliGRAM(s) Oral every 4 hours PRN Moderate Pain (4 - 6)      DVT PROPHYLAXIS: enoxaparin Injectable 40 milliGRAM(s) SubCutaneous every 24 hours    GI PROPHYLAXIS:   ANTICOAGULATION:   ANTIBIOTICS:  piperacillin/tazobactam IVPB.. 3.375 Gram(s)            LAB/STUDIES:  Labs:  CAPILLARY BLOOD GLUCOSE                              14.6   6.70  )-----------( 297      ( 27 Sep 2022 01:39 )             42.0         09-27    132<L>  |  94<L>  |  11  ----------------------------<  161<H>  4.1   |  23  |  0.9          LFTs:             7.0  | 3.2  | 15       ------------------[69      ( 27 Sep 2022 01:39 )  4.3  | 0.7  | 25          Lipase:15     Amylase:x         Lactate, Blood: 3.5 mmol/L (09-27-22 @ 01:39)      Coags:     14.10  ----< 1.23    ( 27 Sep 2022 11:17 )     29.6        IMAGING:  < from: Xray Chest 1 View-PORTABLE IMMEDIATE (Xray Chest 1 View-PORTABLE IMMEDIATE .) (09.27.22 @ 11:58) >  Impression:    No radiographic evidence of acute cardiopulmonary disease.    Bibasilar dependent atelectasis.    --- End of Report ---    < end of copied text >  < from: CT Abdomen and Pelvis w/ IV Cont (09.27.22 @ 03:38) >  IMPRESSION:    Acute perforated appendicitis as detailed above. No abscess formation.    Additional findings of perforation discussed with Jordy Watkins at 5:49 AM    --- End of Report ---    < end of copied text >    · Assessment	  52M w/ no PMH/PSH who presents with clinical and radiographic findings consistent with acute, perforated appendicitis. S/P laparoscopic appendectomy (9/28)    Plan:  - NPO due to peritonitis, IVF  - Monitor bowel functions  -IV Zosyn  - Monitor CYNTHIA output  - Monitor mccormack output, possible dc of mccormack GENERAL SURGERY PROGRESS NOTE    Patient: SE ESCOBAR , 52y ([birth year])Male   MRN: [id number]  Location: [address]  Visit: 09-27-22 Inpatient  Date: 09-28-22 @ 08:19    Hospital Day #: 2  Post-Op Day #: 0    Procedure/Dx/Injuries: S/P laparoscopic appendectomy    Events of past 24 hours: Patient had a laparoscopic appendectomy performed where a gangrenous, perforated appendicitis with feculent peritonitis was found. Patient was washed out this AM. NPO on LR at 125. CYNTHIA drain in RLQ and mccormack in place. Post -op check was done. Patient in minimal pain from incisions. No flatus or bowel movements as of yet.    PAST MEDICAL & SURGICAL HISTORY:      Vitals:   T(F): 97.4 (09-28-22 @ 03:47), Max: 100.3 (09-27-22 @ 16:34)  HR: 75 (09-28-22 @ 03:47)  BP: 131/83 (09-28-22 @ 03:47)  RR: 18 (09-28-22 @ 03:47)  SpO2: 97% (09-28-22 @ 03:47)      Diet, NPO:   Except Medications     Special Instructions for Nursing:  Except Medications      Fluids: lactated ringers.: Solution, 1000 milliLiter(s) infuse at 125 mL/Hr      I & O's:    09-27-22 @ 07:01  -  09-28-22 @ 07:00  --------------------------------------------------------  IN:    Lactated Ringers: 125 mL  Total IN: 125 mL    OUT:    Drain (mL): 140 mL    Indwelling Catheter - Urethral (mL): 600 mL  Total OUT: 740 mL    Total NET: -615 mL        Bowel Movement: : [] YES [x] NO  Flatus: : [] YES [x] NO    PHYSICAL EXAM:  General: NAD, AAOx3, calm and cooperative  HEENT: Trachea ML  Cardiac: S1, S2  Respiratory: Normal respiratory effort  Abdomen: CYNTHIA drain. Mild incisional TTP. Soft, non-distended, no rebound, no guarding.   Skin: Warm/dry  Incision/wound: Dressings in place, clean, dry and intact    MEDICATIONS  (STANDING):  enoxaparin Injectable 40 milliGRAM(s) SubCutaneous every 24 hours  lactated ringers. 1000 milliLiter(s) (125 mL/Hr) IV Continuous <Continuous>  piperacillin/tazobactam IVPB.. 3.375 Gram(s) IV Intermittent every 8 hours    MEDICATIONS  (PRN):  acetaminophen     Tablet .. 1000 milliGRAM(s) Oral every 6 hours PRN Mild Pain (1 - 3)  HYDROmorphone  Injectable 0.5 milliGRAM(s) IV Push every 10 minutes PRN Moderate Pain (4 - 6)  oxyCODONE    IR 5 milliGRAM(s) Oral every 4 hours PRN Moderate Pain (4 - 6)      DVT PROPHYLAXIS: enoxaparin Injectable 40 milliGRAM(s) SubCutaneous every 24 hours    GI PROPHYLAXIS:   ANTICOAGULATION:   ANTIBIOTICS:  piperacillin/tazobactam IVPB.. 3.375 Gram(s)            LAB/STUDIES:  Labs:  CAPILLARY BLOOD GLUCOSE                              14.6   6.70  )-----------( 297      ( 27 Sep 2022 01:39 )             42.0         09-27    132<L>  |  94<L>  |  11  ----------------------------<  161<H>  4.1   |  23  |  0.9          LFTs:             7.0  | 3.2  | 15       ------------------[69      ( 27 Sep 2022 01:39 )  4.3  | 0.7  | 25          Lipase:15     Amylase:x         Lactate, Blood: 3.5 mmol/L (09-27-22 @ 01:39)      Coags:     14.10  ----< 1.23    ( 27 Sep 2022 11:17 )     29.6        IMAGING:  < from: Xray Chest 1 View-PORTABLE IMMEDIATE (Xray Chest 1 View-PORTABLE IMMEDIATE .) (09.27.22 @ 11:58) >  Impression:    No radiographic evidence of acute cardiopulmonary disease.    Bibasilar dependent atelectasis.    --- End of Report ---    < end of copied text >  < from: CT Abdomen and Pelvis w/ IV Cont (09.27.22 @ 03:38) >  IMPRESSION:    Acute perforated appendicitis as detailed above. No abscess formation.    Additional findings of perforation discussed with Jordy Watkins at 5:49 AM    --- End of Report ---    < end of copied text >    · Assessment	  52M w/ no PMH/PSH who presents with clinical and radiographic findings consistent with acute, perforated appendicitis. S/P laparoscopic appendectomy (9/28)    Plan:  - NPO due to peritonitis, IVF  - Monitor bowel functions  -IV Zosyn  - Monitor CYNTHIA output  - Monitor mccormack output, possible dc of mccormack

## 2022-09-28 NOTE — BRIEF OPERATIVE NOTE - OPERATION/FINDINGS
Perforated, gangrenous appendix with diffuse peritonitis. 60mm purple staple deployed across distal cecum x 2 loads. CYNTHIA drain placed in RLQ.

## 2022-09-28 NOTE — CHART NOTE - NSCHARTNOTEFT_GEN_A_CORE
PACU ANESTHESIA ADMISSION NOTE      Procedure: Laparoscopic appendectomy  Post op diagnosis:  acute appendicitis    __x__  Patent Airway    __x__  Full return of protective reflexes    ____  Full recovery from anesthesia / back to baseline     Vitals:   see anesthesia record      Mental Status:  _x___ Awake   _____ Alert   _____ Drowsy   _____ Sedated    Nausea/Vomiting:  ___x_ NO  ______Yes,   See Post - Op Orders          Pain Scale (0-10):  _____    Treatment: ____ None    ___x_ See Post - Op/PCA Orders    Post - Operative Fluids:   ____ Oral   __x__ See Post - Op Orders    Plan: Discharge:   ____Home       __x___Floor     _____Critical Care    _____  Other:_________________    Comments:  Uneventful intraoperative course. No anesthesia issues or complications noted. Patient stable upon arrival to PACU. Report given to RN.

## 2022-09-28 NOTE — PATIENT PROFILE ADULT - FALL HARM RISK - HARM RISK INTERVENTIONS
Assistance with ambulation/Assistance OOB with selected safe patient handling equipment/Communicate Risk of Fall with Harm to all staff/Discuss with provider need for PT consult/Monitor gait and stability/Provide patient with walking aids - walker, cane, crutches/Reinforce activity limits and safety measures with patient and family/Sit up slowly, dangle for a short time, stand at bedside before walking/Tailored Fall Risk Interventions/Use of alarms - bed, chair and/or voice tab/Visual Cue: Yellow wristband and red socks/Bed in lowest position, wheels locked, appropriate side rails in place/Call bell, personal items and telephone in reach/Instruct patient to call for assistance before getting out of bed or chair/Non-slip footwear when patient is out of bed/[place] to call system/Physically safe environment - no spills, clutter or unnecessary equipment/Purposeful Proactive Rounding/Room/bathroom lighting operational, light cord in reach

## 2022-09-29 LAB
ALBUMIN SERPL ELPH-MCNC: 2.8 G/DL — LOW (ref 3.5–5.2)
ALP SERPL-CCNC: 59 U/L — SIGNIFICANT CHANGE UP (ref 30–115)
ALT FLD-CCNC: 27 U/L — SIGNIFICANT CHANGE UP (ref 0–41)
ANION GAP SERPL CALC-SCNC: 10 MMOL/L — SIGNIFICANT CHANGE UP (ref 7–14)
APTT BLD: 29 SEC — SIGNIFICANT CHANGE UP (ref 27–39.2)
AST SERPL-CCNC: 27 U/L — SIGNIFICANT CHANGE UP (ref 0–41)
BASOPHILS # BLD AUTO: 0 K/UL — SIGNIFICANT CHANGE UP (ref 0–0.2)
BASOPHILS NFR BLD AUTO: 0 % — SIGNIFICANT CHANGE UP (ref 0–1)
BILIRUB DIRECT SERPL-MCNC: 3.5 MG/DL — HIGH (ref 0–0.3)
BILIRUB INDIRECT FLD-MCNC: 1.1 MG/DL — SIGNIFICANT CHANGE UP (ref 0.2–1.2)
BILIRUB SERPL-MCNC: 4.6 MG/DL — HIGH (ref 0.2–1.2)
BUN SERPL-MCNC: 13 MG/DL — SIGNIFICANT CHANGE UP (ref 10–20)
CALCIUM SERPL-MCNC: 8.5 MG/DL — SIGNIFICANT CHANGE UP (ref 8.4–10.5)
CHLORIDE SERPL-SCNC: 97 MMOL/L — LOW (ref 98–110)
CO2 SERPL-SCNC: 25 MMOL/L — SIGNIFICANT CHANGE UP (ref 17–32)
CREAT SERPL-MCNC: 0.8 MG/DL — SIGNIFICANT CHANGE UP (ref 0.7–1.5)
EGFR: 106 ML/MIN/1.73M2 — SIGNIFICANT CHANGE UP
EOSINOPHIL # BLD AUTO: 0 K/UL — SIGNIFICANT CHANGE UP (ref 0–0.7)
EOSINOPHIL NFR BLD AUTO: 0 % — SIGNIFICANT CHANGE UP (ref 0–8)
GIANT PLATELETS BLD QL SMEAR: PRESENT — SIGNIFICANT CHANGE UP
GLUCOSE SERPL-MCNC: 99 MG/DL — SIGNIFICANT CHANGE UP (ref 70–99)
HCT VFR BLD CALC: 30.4 % — LOW (ref 42–52)
HGB BLD-MCNC: 10.6 G/DL — LOW (ref 14–18)
INR BLD: 1.06 RATIO — SIGNIFICANT CHANGE UP (ref 0.65–1.3)
LDH SERPL L TO P-CCNC: 164 U/L — SIGNIFICANT CHANGE UP (ref 50–242)
LYMPHOCYTES # BLD AUTO: 1.21 K/UL — SIGNIFICANT CHANGE UP (ref 1.2–3.4)
LYMPHOCYTES # BLD AUTO: 8.8 % — LOW (ref 20.5–51.1)
MAGNESIUM SERPL-MCNC: 2.2 MG/DL — SIGNIFICANT CHANGE UP (ref 1.8–2.4)
MANUAL SMEAR VERIFICATION: SIGNIFICANT CHANGE UP
MCHC RBC-ENTMCNC: 31.5 PG — HIGH (ref 27–31)
MCHC RBC-ENTMCNC: 34.9 G/DL — SIGNIFICANT CHANGE UP (ref 32–37)
MCV RBC AUTO: 90.5 FL — SIGNIFICANT CHANGE UP (ref 80–94)
METAMYELOCYTES # FLD: 0.9 % — HIGH (ref 0–0)
MONOCYTES # BLD AUTO: 0.48 K/UL — SIGNIFICANT CHANGE UP (ref 0.1–0.6)
MONOCYTES NFR BLD AUTO: 3.5 % — SIGNIFICANT CHANGE UP (ref 1.7–9.3)
NEUTROPHILS # BLD AUTO: 11.79 K/UL — HIGH (ref 1.4–6.5)
NEUTROPHILS NFR BLD AUTO: 77.9 % — HIGH (ref 42.2–75.2)
NEUTS BAND # BLD: 8 % — HIGH (ref 0–6)
PHOSPHATE SERPL-MCNC: 2 MG/DL — LOW (ref 2.1–4.9)
PLAT MORPH BLD: NORMAL — SIGNIFICANT CHANGE UP
PLATELET # BLD AUTO: 251 K/UL — SIGNIFICANT CHANGE UP (ref 130–400)
POLYCHROMASIA BLD QL SMEAR: SLIGHT — SIGNIFICANT CHANGE UP
POTASSIUM SERPL-MCNC: 4.4 MMOL/L — SIGNIFICANT CHANGE UP (ref 3.5–5)
POTASSIUM SERPL-SCNC: 4.4 MMOL/L — SIGNIFICANT CHANGE UP (ref 3.5–5)
PROT SERPL-MCNC: 5 G/DL — LOW (ref 6–8)
PROTHROM AB SERPL-ACNC: 12.2 SEC — SIGNIFICANT CHANGE UP (ref 9.95–12.87)
RBC # BLD: 3.36 M/UL — LOW (ref 4.7–6.1)
RBC # FLD: 12.9 % — SIGNIFICANT CHANGE UP (ref 11.5–14.5)
RBC BLD AUTO: ABNORMAL
SODIUM SERPL-SCNC: 132 MMOL/L — LOW (ref 135–146)
SURGICAL PATHOLOGY STUDY: SIGNIFICANT CHANGE UP
VARIANT LYMPHS # BLD: 0.9 % — SIGNIFICANT CHANGE UP (ref 0–5)
WBC # BLD: 13.72 K/UL — HIGH (ref 4.8–10.8)
WBC # FLD AUTO: 13.72 K/UL — HIGH (ref 4.8–10.8)

## 2022-09-29 PROCEDURE — 99222 1ST HOSP IP/OBS MODERATE 55: CPT

## 2022-09-29 PROCEDURE — 99024 POSTOP FOLLOW-UP VISIT: CPT

## 2022-09-29 PROCEDURE — 76705 ECHO EXAM OF ABDOMEN: CPT | Mod: 26

## 2022-09-29 RX ORDER — POTASSIUM PHOSPHATE, MONOBASIC POTASSIUM PHOSPHATE, DIBASIC 236; 224 MG/ML; MG/ML
30 INJECTION, SOLUTION INTRAVENOUS ONCE
Refills: 0 | Status: COMPLETED | OUTPATIENT
Start: 2022-09-29 | End: 2022-09-29

## 2022-09-29 RX ORDER — MAGNESIUM SULFATE 500 MG/ML
2 VIAL (ML) INJECTION ONCE
Refills: 0 | Status: COMPLETED | OUTPATIENT
Start: 2022-09-29 | End: 2022-09-29

## 2022-09-29 RX ADMIN — Medication 400 MILLIGRAM(S): at 14:26

## 2022-09-29 RX ADMIN — Medication 400 MILLIGRAM(S): at 21:35

## 2022-09-29 RX ADMIN — SODIUM CHLORIDE 125 MILLILITER(S): 9 INJECTION, SOLUTION INTRAVENOUS at 14:28

## 2022-09-29 RX ADMIN — SENNA PLUS 2 TABLET(S): 8.6 TABLET ORAL at 21:35

## 2022-09-29 RX ADMIN — Medication 25 GRAM(S): at 05:14

## 2022-09-29 RX ADMIN — PIPERACILLIN AND TAZOBACTAM 25 GRAM(S): 4; .5 INJECTION, POWDER, LYOPHILIZED, FOR SOLUTION INTRAVENOUS at 14:26

## 2022-09-29 RX ADMIN — Medication 400 MILLIGRAM(S): at 05:02

## 2022-09-29 RX ADMIN — PIPERACILLIN AND TAZOBACTAM 25 GRAM(S): 4; .5 INJECTION, POWDER, LYOPHILIZED, FOR SOLUTION INTRAVENOUS at 05:02

## 2022-09-29 RX ADMIN — PIPERACILLIN AND TAZOBACTAM 25 GRAM(S): 4; .5 INJECTION, POWDER, LYOPHILIZED, FOR SOLUTION INTRAVENOUS at 21:35

## 2022-09-29 RX ADMIN — ENOXAPARIN SODIUM 40 MILLIGRAM(S): 100 INJECTION SUBCUTANEOUS at 17:47

## 2022-09-29 RX ADMIN — POTASSIUM PHOSPHATE, MONOBASIC POTASSIUM PHOSPHATE, DIBASIC 83.33 MILLIMOLE(S): 236; 224 INJECTION, SOLUTION INTRAVENOUS at 05:14

## 2022-09-29 NOTE — CONSULT NOTE ADULT - ATTENDING COMMENTS
52 year old  male admitted with perforated appendicitis and found to have isolated hyperbilirubinemia.  No prior bouts of jaundice.   Has had prior indirect hyperbilirubinemia.   Looks well  Icteric  Abdomen no RUQ tenderness LLQ dressing  Had predominant indirect hyperbilirubinemia initially which is likely Gilbert's syndrome but would rule out hemolysis.  Direct hyperbilirubinemia now and could be due to antibiotics.   UGT1a1 testing can confirm Gilbert's.

## 2022-09-29 NOTE — PROGRESS NOTE ADULT - SUBJECTIVE AND OBJECTIVE BOX
GENERAL SURGERY PROGRESS NOTE    Patient: SE ESCOBAR , 52y ([birth year])Male   MRN: [id number]  Location: [address]  Visit: 09-27-22 Inpatient  Date: 09-29-22 @ 13:38    Hospital Day #: 3  Post-Op Day #: 1    Procedure/Dx/Injuries: acute perforated appendicitis, s/p laparoscopic appendectomy, unruly drain in place.    Events of past 24 hours: POD 1, patient is on sips and chips, +flatus, no BM with abdominal distension. The patient is ambulating around the unit. The patient had a mccormack removed and passed TOV. The patient has up-trending LFTs, hepatology saw the patient and recommends haptoglobin, lfts and LDH with rountine labs tonight. RUQ sonogram is within normal limits.     PAST MEDICAL & SURGICAL HISTORY:      Vitals:   T(F): 96.5 (09-29-22 @ 09:00), Max: 99.8 (09-28-22 @ 16:28)  HR: 66 (09-29-22 @ 09:00)  BP: 134/87 (09-29-22 @ 09:00)  RR: 18 (09-29-22 @ 09:00)  SpO2: 96% (09-29-22 @ 09:00)      Diet, NPO:   Except Medications  With Ice Chips/Sips of Water      Fluids:     I & O's:    09-28-22 @ 07:01  -  09-29-22 @ 07:00  --------------------------------------------------------  IN:    IV PiggyBack: 600 mL    Lactated Ringers: 1500 mL  Total IN: 2100 mL    OUT:    Drain (mL): 90 mL    Voided (mL): 1600 mL  Total OUT: 1690 mL    Total NET: 410 mL    Bowel Movement: : [] YES [x] NO  Flatus: : [x] YES [] NO    PHYSICAL EXAM:  GENERAL: NAD, well-appearing, scleral icterus, jaundice.  CHEST/LUNG: Clear to auscultation bilaterally  HEART: Regular rate and rhythm  ABDOMEN: Distended, incisions clean and dry. Soft, Nontender,  EXTREMITIES:  No clubbing, cyanosis, or edema    MEDICATIONS  (STANDING):  enoxaparin Injectable 40 milliGRAM(s) SubCutaneous every 24 hours  ibuprofen  Tablet. 400 milliGRAM(s) Oral every 8 hours  lactated ringers. 1000 milliLiter(s) (125 mL/Hr) IV Continuous <Continuous>  piperacillin/tazobactam IVPB.. 3.375 Gram(s) IV Intermittent every 8 hours  senna 2 Tablet(s) Oral at bedtime    MEDICATIONS  (PRN):  HYDROmorphone  Injectable 0.5 milliGRAM(s) IV Push every 10 minutes PRN Moderate Pain (4 - 6)  oxyCODONE    IR 5 milliGRAM(s) Oral every 4 hours PRN Moderate Pain (4 - 6)      DVT PROPHYLAXIS: enoxaparin Injectable 40 milliGRAM(s) SubCutaneous every 24 hours    GI PROPHYLAXIS:   ANTICOAGULATION:   ANTIBIOTICS:  piperacillin/tazobactam IVPB.. 3.375 Gram(s)    LAB/STUDIES:  Labs:  CAPILLARY BLOOD GLUCOSE                       12.1   15.57 )-----------( 270      ( 28 Sep 2022 20:00 )             34.8       Auto Neutrophil %: 88.3 % (09-28-22 @ 20:00)  Auto Immature Granulocyte %: 1.1 % (09-28-22 @ 20:00)    09-28    130<L>  |  95<L>  |  11  ----------------------------<  111<H>  4.1   |  25  |  0.9    Calcium, Total Serum: 9.3 mg/dL (09-28-22 @ 20:00)    LFTs:             5.5  | 6.4  | 25       ------------------[55      ( 28 Sep 2022 20:00 )  3.1  | 4.1  | 22           Lactate, Blood: 3.5 mmol/L (09-27-22 @ 01:39)    IMAGING:  < from: US Abdomen Upper Quadrant Right (09.29.22 @ 08:58) >  IMPRESSION:  Normal right upper quadrant abdominal ultrasound.        --- End of Report ---    < end of copied text >      ACCESS/ DEVICES:  [x ] Peripheral IV

## 2022-09-29 NOTE — CONSULT NOTE ADULT - ASSESSMENT
52M w/ no PMH/PSH who presents with 3-4 days of acute abdominal pain, initially in the lower quadrants but as of this time more diffuse in all quadrants with reduced PO intake admitted for perforated appendicitis on 9/27 went for OR on 9/28. hepatology was consulted for elevated T.bili.     #)Direct hyperbilurubinemia  -has h/o indirect hyperbilurubinemia   -LFT 4/2022: 1.4/58/27/30 (indirect bili1.2)  -Admitted with LFT 3.2/69/15/25  -LFT 9/28: 6.4/55/25/22  -INR 1.23    Recs:   us abdomen   Trend LFT   Avoid hepatotoxic medications    incomplete note  52M w/ no PMH/PSH who presents with 3-4 days of acute abdominal pain, initially in the lower quadrants but as of this time more diffuse in all quadrants with reduced PO intake admitted for perforated appendicitis on 9/27 went for OR on 9/28. hepatology was consulted for elevated T.bili.     #)Direct hyperbilurubinemia likely due to DILI  -has h/o indirect hyperbilurubinemia   -LFT 4/2022: 1.4/58/27/30 (indirect bili1.2)  -Admitted with LFT 3.2/69/15/25  -LFT 9/28: 6.4/55/25/22  -INR 1.23  -received cefotetan on 9/27 and zosyn on 9/27 and 9/28     Recs:   us abdomen   Trend LFT, INR   Avoid hepatotoxic medications     52M w/ no PMH/PSH who presents with 3-4 days of acute abdominal pain, initially in the lower quadrants but as of this time more diffuse in all quadrants with reduced PO intake admitted for perforated appendicitis on 9/27 went for OR on 9/28. hepatology was consulted for elevated T.bili.     #)Direct hyperbilurubinemia likely due to DILI   -has h/o indirect hyperbilurubinemia likley due to gilbert syndrome   -the inc in direct bilurubinemia is likely due to DILI superimposed on underlying possible gilbert   -LFT 4/2022: 1.4/58/27/30 (indirect bili1.2)  -Admitted with LFT 3.2/69/15/25  -LFT 9/28: 6.4/55/25/22  -INR 1.23  -received cefotetan on 9/27 and zosyn on 9/27 and 9/28   -US abdomen CBD 4mm, no gall stones    Recs:   check haptoglobin, LDH (less likley hemolysis given inc in direct but will r/o)  Trend LFT, INR   Avoid hepatotoxic medications

## 2022-09-29 NOTE — PHYSICAL THERAPY INITIAL EVALUATION ADULT - PATIENT PROFILE REVIEW, REHAB EVAL
Chart reviewed. PT did not complete PT IE 2/2 PT spoke c pt and pt states he has been ambulating around the floors 3-4x/day s AD, independently. Pt only has 1 MONICA home. PT IE not appropriate in this case. PT educated pt to continue ambulating 3-4x/day until D/C. Please consult PT in the future on this case should it be necessary./yes
yes

## 2022-09-29 NOTE — CONSULT NOTE ADULT - SUBJECTIVE AND OBJECTIVE BOX
Gastroenterology Consultation:    Patient is a 52y old  Male who presents with a chief complaint of acute appendicitis (28 Sep 2022 08:18)      Admitted on: 09-27-22  HPI:  52M w/ no PMH/PSH who presents with 3-4 days of acute abdominal pain, initially in the lower quadrants but as of this time more diffuse in all quadrants with reduced PO intake admitted for perforated appendicitis on 9/27 went for OR on 9/28. hepatology was consulted for elevated T.bili.     Prior EGD:  Prior Colonoscopy: 12/2020 none on chart only pathology report available    PAST MEDICAL & SURGICAL HISTORY:  none     FAMILY HISTORY:  no family h/o Gi cancer     Social History:  Tobacco:  Alcohol:  Drugs:    Home Medications:    MEDICATIONS  (STANDING):  enoxaparin Injectable 40 milliGRAM(s) SubCutaneous every 24 hours  ibuprofen  Tablet. 400 milliGRAM(s) Oral every 8 hours  lactated ringers. 1000 milliLiter(s) (125 mL/Hr) IV Continuous <Continuous>  piperacillin/tazobactam IVPB.. 3.375 Gram(s) IV Intermittent every 8 hours  senna 2 Tablet(s) Oral at bedtime    MEDICATIONS  (PRN):  HYDROmorphone  Injectable 0.5 milliGRAM(s) IV Push every 10 minutes PRN Moderate Pain (4 - 6)  oxyCODONE    IR 5 milliGRAM(s) Oral every 4 hours PRN Moderate Pain (4 - 6)      Allergies  No Known Allergies      Review of Systems:   Constitutional:  No Fever, No Chills  ENT/Mouth:  No Hearing Changes,  No Difficulty Swallowing  Eyes:  No Eye Pain, No Vision Changes  Cardiovascular:  No Chest Pain, No Palpitations  Respiratory:  No Cough, No Dyspnea  Gastrointestinal:  As described in HPI  Musculoskeletal:  No Joint Swelling, No Back Pain  Skin:  No Skin Lesions, No Jaundice  Neuro:  No Syncope, No Dizziness  Heme/Lymph:  No Bruising, No Bleeding.          Physical Examination:  T(C): 36.8 (09-29-22 @ 05:00), Max: 37.7 (09-28-22 @ 16:28)  HR: 69 (09-29-22 @ 05:00) (69 - 84)  BP: 120/81 (09-29-22 @ 05:00) (103/57 - 131/79)  RR: 18 (09-29-22 @ 05:00) (18 - 18)  SpO2: 98% (09-29-22 @ 05:00) (94% - 100%)      09-27-22 @ 07:01  -  09-28-22 @ 07:00  --------------------------------------------------------  IN: 250 mL / OUT: 740 mL / NET: -490 mL    09-28-22 @ 07:01  -  09-29-22 @ 07:00  --------------------------------------------------------  IN: 2100 mL / OUT: 1690 mL / NET: 410 mL        Constitutional: No acute distress.  Eyes:. Conjunctivae are clear, Sclera is non-icteric.  Ears Nose and Throat: The external ears are normal appearing,  Oral mucosa is pink and moist.  Respiratory:  No signs of respiratory distress. Lung sounds are clear bilaterally.  Cardiovascular:  S1 S2, Regular rate and rhythm.  GI: Abdomen is soft, symmetric, and non-tender without distention.          Data:                        12.1   15.57 )-----------( 270      ( 28 Sep 2022 20:00 )             34.8     Hgb Trend:  12.1  09-28-22 @ 20:00  11.6  09-28-22 @ 11:21  14.6  09-27-22 @ 01:39        09-28    130<L>  |  95<L>  |  11  ----------------------------<  111<H>  4.1   |  25  |  0.9    Ca    9.3      28 Sep 2022 20:00  Phos  1.6     09-28  Mg     1.8     09-28    TPro  5.5<L>  /  Alb  3.1<L>  /  TBili  6.4<H>  /  DBili  4.1<H>  /  AST  25  /  ALT  22  /  AlkPhos  55  09-28    Liver panel trend:  TBili 6.4   /   AST 25   /   ALT 22   /   AlkP 55   /   Tptn 5.5   /   Alb 3.1    /   DBili 4.1      09-28  TBili 5.8   /   AST 16   /   ALT 16   /   AlkP 55   /   Tptn 5.3   /   Alb 3.0    /   DBili 3.4      09-28  TBili 3.2   /   AST 15   /   ALT 25   /   AlkP 69   /   Tptn 7.0   /   Alb 4.3    /   DBili 0.7      09-27      PT/INR - ( 27 Sep 2022 11:17 )   PT: 14.10 sec;   INR: 1.23 ratio         PTT - ( 27 Sep 2022 11:17 )  PTT:29.6 sec        Radiology:  < from: CT Abdomen and Pelvis w/ IV Cont (09.27.22 @ 03:38) >  FINDINGS:    LOWER CHEST: Fissural and subpleural nodules at the right lung base   measuring up to 5.5 mm, suspect pulmonary lymph nodes. Mild bibasilar   subsegmental atelectasis. Small hiatal hernia.    HEPATOBILIARY: Subcentimeter hepatic lobe hypodensity, too small to   characterize (7/175)..    SPLEEN: Unremarkable.    PANCREAS: Unremarkable.    ADRENAL GLANDS: Unremarkable.    KIDNEYS: Symmetric renal enhancement. No hydronephrosis.    ABDOMINOPELVIC NODES: Unremarkable.    PELVIC ORGANS: Unremarkable.    PERITONEUM/MESENTERY/BOWEL: There is a wall thickened dilated appendix to   approximately 2 cm with multiple appendicoliths at the appendiceal base   measuring up to 1 cm with surrounding fat inflammation and phlegmon.   There are a few scattered punctate locules of air in the right   hemiabdomen consistent with perforation, suspect perforated site is at   the appendiceal base. Small amount of free pelvic fluid. No evidence for   abscess formation.    BONES/SOFT TISSUES: Degenerative changes of the spine.    OTHER: Normal caliber abdominal aorta.      IMPRESSION:    Acute perforated appendicitis as detailed above. No abscess formation.    Additional findings of perforation discussed with Jordy Watkins at 5:49 AM    --- End of Report ---    < end of copied text >

## 2022-09-30 LAB
ALBUMIN SERPL ELPH-MCNC: 2.8 G/DL — LOW (ref 3.5–5.2)
ALP SERPL-CCNC: 67 U/L — SIGNIFICANT CHANGE UP (ref 30–115)
ALT FLD-CCNC: 29 U/L — SIGNIFICANT CHANGE UP (ref 0–41)
ANION GAP SERPL CALC-SCNC: 15 MMOL/L — HIGH (ref 7–14)
AST SERPL-CCNC: 22 U/L — SIGNIFICANT CHANGE UP (ref 0–41)
BASOPHILS # BLD AUTO: 0.02 K/UL — SIGNIFICANT CHANGE UP (ref 0–0.2)
BASOPHILS NFR BLD AUTO: 0.2 % — SIGNIFICANT CHANGE UP (ref 0–1)
BILIRUB DIRECT SERPL-MCNC: 2.5 MG/DL — HIGH (ref 0–0.3)
BILIRUB INDIRECT FLD-MCNC: 0.7 MG/DL — SIGNIFICANT CHANGE UP (ref 0.2–1.2)
BILIRUB SERPL-MCNC: 3.2 MG/DL — HIGH (ref 0.2–1.2)
BUN SERPL-MCNC: 9 MG/DL — LOW (ref 10–20)
CALCIUM SERPL-MCNC: 8.4 MG/DL — SIGNIFICANT CHANGE UP (ref 8.4–10.4)
CHLORIDE SERPL-SCNC: 95 MMOL/L — LOW (ref 98–110)
CO2 SERPL-SCNC: 24 MMOL/L — SIGNIFICANT CHANGE UP (ref 17–32)
CREAT SERPL-MCNC: 0.8 MG/DL — SIGNIFICANT CHANGE UP (ref 0.7–1.5)
EGFR: 106 ML/MIN/1.73M2 — SIGNIFICANT CHANGE UP
EOSINOPHIL # BLD AUTO: 0.22 K/UL — SIGNIFICANT CHANGE UP (ref 0–0.7)
EOSINOPHIL NFR BLD AUTO: 2.1 % — SIGNIFICANT CHANGE UP (ref 0–8)
GLUCOSE SERPL-MCNC: 105 MG/DL — HIGH (ref 70–99)
HAPTOGLOB SERPL-MCNC: 414 MG/DL — HIGH (ref 34–200)
HCT VFR BLD CALC: 32 % — LOW (ref 42–52)
HGB BLD-MCNC: 11.2 G/DL — LOW (ref 14–18)
IMM GRANULOCYTES NFR BLD AUTO: 0.8 % — HIGH (ref 0.1–0.3)
LYMPHOCYTES # BLD AUTO: 1.22 K/UL — SIGNIFICANT CHANGE UP (ref 1.2–3.4)
LYMPHOCYTES # BLD AUTO: 11.7 % — LOW (ref 20.5–51.1)
MAGNESIUM SERPL-MCNC: 2 MG/DL — SIGNIFICANT CHANGE UP (ref 1.8–2.4)
MCHC RBC-ENTMCNC: 31.8 PG — HIGH (ref 27–31)
MCHC RBC-ENTMCNC: 35 G/DL — SIGNIFICANT CHANGE UP (ref 32–37)
MCV RBC AUTO: 90.9 FL — SIGNIFICANT CHANGE UP (ref 80–94)
MONOCYTES # BLD AUTO: 1.35 K/UL — HIGH (ref 0.1–0.6)
MONOCYTES NFR BLD AUTO: 13 % — HIGH (ref 1.7–9.3)
NEUTROPHILS # BLD AUTO: 7.53 K/UL — HIGH (ref 1.4–6.5)
NEUTROPHILS NFR BLD AUTO: 72.2 % — SIGNIFICANT CHANGE UP (ref 42.2–75.2)
NRBC # BLD: 0 /100 WBCS — SIGNIFICANT CHANGE UP (ref 0–0)
PHOSPHATE SERPL-MCNC: 1.8 MG/DL — LOW (ref 2.1–4.9)
PLATELET # BLD AUTO: 309 K/UL — SIGNIFICANT CHANGE UP (ref 130–400)
POTASSIUM SERPL-MCNC: 3.6 MMOL/L — SIGNIFICANT CHANGE UP (ref 3.5–5)
POTASSIUM SERPL-SCNC: 3.6 MMOL/L — SIGNIFICANT CHANGE UP (ref 3.5–5)
PROT SERPL-MCNC: 5.4 G/DL — LOW (ref 6–8)
RBC # BLD: 3.52 M/UL — LOW (ref 4.7–6.1)
RBC # FLD: 13.2 % — SIGNIFICANT CHANGE UP (ref 11.5–14.5)
SODIUM SERPL-SCNC: 134 MMOL/L — LOW (ref 135–146)
WBC # BLD: 10.42 K/UL — SIGNIFICANT CHANGE UP (ref 4.8–10.8)
WBC # FLD AUTO: 10.42 K/UL — SIGNIFICANT CHANGE UP (ref 4.8–10.8)

## 2022-09-30 PROCEDURE — 99024 POSTOP FOLLOW-UP VISIT: CPT

## 2022-09-30 PROCEDURE — 99232 SBSQ HOSP IP/OBS MODERATE 35: CPT

## 2022-09-30 RX ADMIN — PIPERACILLIN AND TAZOBACTAM 25 GRAM(S): 4; .5 INJECTION, POWDER, LYOPHILIZED, FOR SOLUTION INTRAVENOUS at 05:25

## 2022-09-30 RX ADMIN — PIPERACILLIN AND TAZOBACTAM 25 GRAM(S): 4; .5 INJECTION, POWDER, LYOPHILIZED, FOR SOLUTION INTRAVENOUS at 15:52

## 2022-09-30 RX ADMIN — Medication 400 MILLIGRAM(S): at 05:25

## 2022-09-30 RX ADMIN — Medication 85 MILLIMOLE(S): at 02:00

## 2022-09-30 RX ADMIN — Medication 400 MILLIGRAM(S): at 15:55

## 2022-09-30 RX ADMIN — PIPERACILLIN AND TAZOBACTAM 25 GRAM(S): 4; .5 INJECTION, POWDER, LYOPHILIZED, FOR SOLUTION INTRAVENOUS at 21:30

## 2022-09-30 RX ADMIN — ENOXAPARIN SODIUM 40 MILLIGRAM(S): 100 INJECTION SUBCUTANEOUS at 18:02

## 2022-09-30 RX ADMIN — Medication 400 MILLIGRAM(S): at 21:30

## 2022-09-30 RX ADMIN — Medication 400 MILLIGRAM(S): at 15:52

## 2022-09-30 NOTE — PROGRESS NOTE ADULT - SUBJECTIVE AND OBJECTIVE BOX
Gastroenterology progress note:     Patient is a 52y old  Male who presents with a chief complaint of acute appendicitis (30 Sep 2022 02:49)       Admitted on: 09-27-22    We are following the patient for elevated t.bili      Interval History:  denies any abdominal pain, nausea, vomiting   passing gas   able to tolerate food        PAST MEDICAL & SURGICAL HISTORY:      MEDICATIONS  (STANDING):  enoxaparin Injectable 40 milliGRAM(s) SubCutaneous every 24 hours  ibuprofen  Tablet. 400 milliGRAM(s) Oral every 8 hours  piperacillin/tazobactam IVPB.. 3.375 Gram(s) IV Intermittent every 8 hours  senna 2 Tablet(s) Oral at bedtime    MEDICATIONS  (PRN):  HYDROmorphone  Injectable 0.5 milliGRAM(s) IV Push every 10 minutes PRN Moderate Pain (4 - 6)  oxyCODONE    IR 5 milliGRAM(s) Oral every 4 hours PRN Moderate Pain (4 - 6)      Allergies  No Known Allergies      Review of Systems:   Cardiovascular:  No Chest Pain, No Palpitations  Respiratory:  No Cough, No Dyspnea  Gastrointestinal:  As described in HPI    Physical Examination:  T(C): 37.2 (09-30-22 @ 08:08), Max: 37.2 (09-30-22 @ 08:08)  HR: 72 (09-30-22 @ 08:08) (64 - 75)  BP: 127/72 (09-30-22 @ 08:08) (124/71 - 140/90)  RR: 18 (09-30-22 @ 08:08) (18 - 18)  SpO2: 96% (09-30-22 @ 08:08) (95% - 97%)      09-29-22 @ 07:01  -  09-30-22 @ 07:00  --------------------------------------------------------  IN: 0 mL / OUT: 1935 mL / NET: -1935 mL      Constitutional: No acute distress.  Respiratory:  No signs of respiratory distress. Lung sounds are clear bilaterally.  Cardiovascular:  S1 S2, Regular rate and rhythm.  Abdominal: Abdomen is soft, symmetric, and non-tender without distention.         Data:                        10.6   13.72 )-----------( 251      ( 29 Sep 2022 22:35 )             30.4     Hgb trend:  10.6  09-29-22 @ 22:35  12.1  09-28-22 @ 20:00  11.6  09-28-22 @ 11:21        09-29    132<L>  |  97<L>  |  13  ----------------------------<  99  4.4   |  25  |  0.8    Ca    8.5      29 Sep 2022 22:35  Phos  2.0     09-29  Mg     2.2     09-29    TPro  5.0<L>  /  Alb  2.8<L>  /  TBili  4.6<H>  /  DBili  3.5<H>  /  AST  27  /  ALT  27  /  AlkPhos  59  09-29    Liver panel trend:  TBili 4.6   /   AST 27   /   ALT 27   /   AlkP 59   /   Tptn 5.0   /   Alb 2.8    /   DBili 3.5      09-29  TBili 6.4   /   AST 25   /   ALT 22   /   AlkP 55   /   Tptn 5.5   /   Alb 3.1    /   DBili 4.1      09-28  TBili 5.8   /   AST 16   /   ALT 16   /   AlkP 55   /   Tptn 5.3   /   Alb 3.0    /   DBili 3.4      09-28  TBili 3.2   /   AST 15   /   ALT 25   /   AlkP 69   /   Tptn 7.0   /   Alb 4.3    /   DBili 0.7      09-27      PT/INR - ( 29 Sep 2022 22:35 )   PT: 12.20 sec;   INR: 1.06 ratio         PTT - ( 29 Sep 2022 22:35 )  PTT:29.0 sec       Radiology:    US Abdomen Upper Quadrant Right:   ACC: [id number] EXAM:  US ABDOMEN RT UPR QUADRANT                          PROCEDURE DATE:  09/29/2022          INTERPRETATION:  CLINICAL INFORMATION: Hyperbilirubinemia.    COMPARISON: CT abdomen and pelvis 5/27/2022    TECHNIQUE: Sonography of the right upper quadrant.    FINDINGS:  Liver: Within normal limits.  Bile ducts: Normal caliber. Common bile duct measures 4 mm.  Gallbladder: Within normal limits.  Pancreas: Visualized portions are within normal limits.  Right kidney: 11.9 cm. No hydronephrosis.  Ascites: None.  IVC: Visualized portions are within normal limits.    IMPRESSION:  Normal right upper quadrant abdominal ultrasound.        --- End of Report ---          KANG ASKEW MD; Resident Radiologist  This document has been electronically signed.  GABRIELE WELDON MD; Attending Interventional Radiologist  This document has been electronically signed. Sep 29 2022 10:18AM (09-29-22 @ 08:58)

## 2022-09-30 NOTE — PROGRESS NOTE ADULT - SUBJECTIVE AND OBJECTIVE BOX
GENERAL SURGERY PROGRESS NOTE    Patient: SE ESCOBAR , 52y ([birth year])Male   MRN: [id number]  Location: [address]  Visit: 22 Inpatient  Date: 22 @ 02:49    Hospital Day #: 4  Post-Op Day #: 2    Events of past 24 hours:  Patient seen and examined at the bedside.  States pain controlled.  Tolerating diet. Denies nausea/vomiting.  Endorses flatus and BM.  Endorses voiding.  Endorses ambulating.      PAST MEDICAL & SURGICAL HISTORY:      Vitals:   T(F): 98.6 (22 @ 00:38), Max: 98.6 (22 @ 00:38)  HR: 75 (22 @ 00:38)  BP: 124/71 (22 @ 00:38)  RR: 18 (22 @ 00:38)  SpO2: 97% (22 @ 00:38)      Diet, NPO:   Except Medications  With Ice Chips/Sips of Water      Fluids:     I & O's:    22 @ 07:01  -  22 @ 07:00  --------------------------------------------------------  IN:    IV PiggyBack: 600 mL    Lactated Ringers: 1500 mL  Total IN: 2100 mL    OUT:    Drain (mL): 90 mL    Voided (mL): 1600 mL  Total OUT: 1690 mL    Total NET: 410 mL    PHYSICAL EXAM:  General: NAD, calm and cooperative.  Cardiac: RRR.  Respiratory: On RA. Speaks in complete sentences. No accessory muscles of respiration in use.  Abdomen: Soft, mildly distended, non-tender, no rebound, no guarding. CYNTHIA drain in place w/ serosang fluid. Dressings clean/dry/intact.  Neuro: Moves all extremities.  Vascular: Pulses 2+ throughout, extremities well perfused.  Skin: Warm/dry, jaundice.      MEDICATIONS  (STANDING):  enoxaparin Injectable 40 milliGRAM(s) SubCutaneous every 24 hours  ibuprofen  Tablet. 400 milliGRAM(s) Oral every 8 hours  lactated ringers. 1000 milliLiter(s) (125 mL/Hr) IV Continuous <Continuous>  piperacillin/tazobactam IVPB.. 3.375 Gram(s) IV Intermittent every 8 hours  senna 2 Tablet(s) Oral at bedtime    MEDICATIONS  (PRN):  HYDROmorphone  Injectable 0.5 milliGRAM(s) IV Push every 10 minutes PRN Moderate Pain (4 - 6)  oxyCODONE    IR 5 milliGRAM(s) Oral every 4 hours PRN Moderate Pain (4 - 6)      DVT PROPHYLAXIS: enoxaparin Injectable 40 milliGRAM(s) SubCutaneous every 24 hours    GI PROPHYLAXIS:   ANTICOAGULATION:   ANTIBIOTICS:  piperacillin/tazobactam IVPB.. 3.375 Gram(s)      LAB/STUDIES:  Labs:  CAPILLARY BLOOD GLUCOSE                          10.6   13.72 )-----------( 251      ( 29 Sep 2022 22:35 )             30.4       Auto Neutrophil %: 77.9 % (22 @ 22:35)  Band Neutrophils %: 8.0 % (22 @ 22:35)        132<L>  |  97<L>  |  13  ----------------------------<  99  4.4   |  25  |  0.8      Calcium, Total Serum: 8.5 mg/dL (22 @ 22:35)      LFTs:             5.0  | 4.6  | 27       ------------------[59      ( 29 Sep 2022 22:35 )  2.8  | 3.5  | 27          Lipase:x      Amylase:x           Coags:     12.20  ----< 1.06    ( 29 Sep 2022 22:35 )     29.0        IMAGIN/29 RUQ US - no acute pathology    ACCESS/ DEVICES:  [ ] Peripheral IV  [ ] CYNTHIA/Jonathan Drains

## 2022-10-01 PROCEDURE — 99024 POSTOP FOLLOW-UP VISIT: CPT

## 2022-10-01 RX ORDER — POTASSIUM CHLORIDE 20 MEQ
20 PACKET (EA) ORAL ONCE
Refills: 0 | Status: COMPLETED | OUTPATIENT
Start: 2022-10-01 | End: 2022-10-01

## 2022-10-01 RX ADMIN — Medication 50 MILLIEQUIVALENT(S): at 13:43

## 2022-10-01 RX ADMIN — Medication 85 MILLIMOLE(S): at 02:15

## 2022-10-01 RX ADMIN — ENOXAPARIN SODIUM 40 MILLIGRAM(S): 100 INJECTION SUBCUTANEOUS at 17:40

## 2022-10-01 RX ADMIN — PIPERACILLIN AND TAZOBACTAM 25 GRAM(S): 4; .5 INJECTION, POWDER, LYOPHILIZED, FOR SOLUTION INTRAVENOUS at 05:20

## 2022-10-01 RX ADMIN — Medication 400 MILLIGRAM(S): at 05:21

## 2022-10-01 RX ADMIN — PIPERACILLIN AND TAZOBACTAM 25 GRAM(S): 4; .5 INJECTION, POWDER, LYOPHILIZED, FOR SOLUTION INTRAVENOUS at 13:43

## 2022-10-01 RX ADMIN — Medication 400 MILLIGRAM(S): at 21:52

## 2022-10-01 RX ADMIN — PIPERACILLIN AND TAZOBACTAM 25 GRAM(S): 4; .5 INJECTION, POWDER, LYOPHILIZED, FOR SOLUTION INTRAVENOUS at 21:52

## 2022-10-01 NOTE — PROGRESS NOTE ADULT - ATTENDING COMMENTS
52 year old  male admitted with perforated appendicitis and found to have isolated hyperbilirubinemia.  No prior bouts of jaundice. Has had prior indirect hyperbilirubinemia.   No complaints. Doing well.   Looks well  Icteric  Abdomen no RUQ tenderness LLQ dressing  Had predominant indirect hyperbilirubinemia initially which is likely Gilbert's syndrome but would rule out hemolysis.  Direct hyperbilirubinemia now and could be due to antibiotics / infection.   Bilirubin has improved to 4.6 Hb stable  UGT1a1 testing can confirm Gilbert's and can be done as OP
Patient is afebrile.  Had flatus and BM  CYNTHIA drain 30 ml serous    Abdomen; soft, nontender    Assessment/Plan:  - clear liquid diet  - OOB  - DVT prophylaxis  - continue Zosyn
advance diet  OOB  DVT prophylaxis
afebrile. Passing some flatus.  Abdomen: mildly distended.    Assessment/Plan:  - continue Sips of clears  - iv Zosyn  - OOB  - DVT prophylaxis
Start sips of clears.  D/c Kothari.  OOB  DVT prophylaxis  CYNTHIA drain output needed strictly recorded  On Zosyn

## 2022-10-01 NOTE — PROGRESS NOTE ADULT - SUBJECTIVE AND OBJECTIVE BOX
SURGERY PROGRESS NOTE     Patient: SE ESCOBAR , 52y ([birth year])Male   MRN: [id number]  Location: [address]  Visit: 09-27-22 Inpatient  Date: 10-01-22 @ 03:04       Events of past 24 hours:  Patient seen resting comfortably in bed. No acute events overnight. Hemodynamically stable.     PAST MEDICAL & SURGICAL HISTORY:       Vitals:   T(F): 99.1 (10-01-22 @ 01:24), Max: 99.1 (10-01-22 @ 01:24)  HR: 66 (10-01-22 @ 01:24)  BP: 143/87 (10-01-22 @ 01:24)  RR: 18 (10-01-22 @ 01:24)  SpO2: 97% (10-01-22 @ 01:24)      Diet, Regular      Fluids:     I & O's:    09-29-22 @ 07:01  -  09-30-22 @ 07:00  --------------------------------------------------------  IN:  Total IN: 0 mL    OUT:    Drain (mL): 35 mL    Voided (mL): 1900 mL  Total OUT: 1935 mL    Total NET: -1935 mL           PHYSICAL EXAM:   General: NAD, AAOx3, calm and cooperative  Cardiac: RRR S1, S2  Respiratory: CTAB, normal respiratory effort  Abdomen: Soft, non-distended, non-tender, no rebound, no guarding. +BS.  Vascular: Pulses 2+ throughout, extremities well perfused  Skin: Warm/dry, normal color, no jaundice  Incision/wound: healing well, dressings in place, clean, dry and intact    MEDICATIONS  (STANDING):  enoxaparin Injectable 40 milliGRAM(s) SubCutaneous every 24 hours  ibuprofen  Tablet. 400 milliGRAM(s) Oral every 8 hours  piperacillin/tazobactam IVPB.. 3.375 Gram(s) IV Intermittent every 8 hours  senna 2 Tablet(s) Oral at bedtime    MEDICATIONS  (PRN):  oxyCODONE    IR 5 milliGRAM(s) Oral every 4 hours PRN Moderate Pain (4 - 6)      DVT PROPHYLAXIS: enoxaparin Injectable 40 milliGRAM(s) SubCutaneous every 24 hours    GI PROPHYLAXIS:   ANTICOAGULATION:   ANTIBIOTICS:  piperacillin/tazobactam IVPB.. 3.375 Gram(s)            LAB/STUDIES:  Labs:  CAPILLARY BLOOD GLUCOSE                              11.2   10.42 )-----------( 309      ( 30 Sep 2022 21:30 )             32.0       Auto Neutrophil %: 72.2 % (09-30-22 @ 21:30)  Auto Immature Granulocyte %: 0.8 % (09-30-22 @ 21:30)    09-30    134<L>  |  95<L>  |  9<L>  ----------------------------<  105<H>  3.6   |  24  |  0.8      Calcium, Total Serum: 8.4 mg/dL (09-30-22 @ 21:30)      LFTs:             5.4  | 3.2  | 22       ------------------[67      ( 30 Sep 2022 21:30 )  2.8  | 2.5  | 29          Lipase:x      Amylase:x             Coags:     12.20  ----< 1.06    ( 29 Sep 2022 22:35 )     29.0

## 2022-10-02 LAB
ALBUMIN SERPL ELPH-MCNC: 2.7 G/DL — LOW (ref 3.5–5.2)
ALP SERPL-CCNC: 128 U/L — HIGH (ref 30–115)
ALT FLD-CCNC: 52 U/L — HIGH (ref 0–41)
ANION GAP SERPL CALC-SCNC: 8 MMOL/L — SIGNIFICANT CHANGE UP (ref 7–14)
AST SERPL-CCNC: 51 U/L — HIGH (ref 0–41)
BASOPHILS # BLD AUTO: 0.03 K/UL — SIGNIFICANT CHANGE UP (ref 0–0.2)
BASOPHILS # BLD AUTO: 0.08 K/UL — SIGNIFICANT CHANGE UP (ref 0–0.2)
BASOPHILS NFR BLD AUTO: 0.3 % — SIGNIFICANT CHANGE UP (ref 0–1)
BASOPHILS NFR BLD AUTO: 0.7 % — SIGNIFICANT CHANGE UP (ref 0–1)
BILIRUB DIRECT SERPL-MCNC: 1.7 MG/DL — HIGH (ref 0–0.3)
BILIRUB INDIRECT FLD-MCNC: 0.6 MG/DL — SIGNIFICANT CHANGE UP (ref 0.2–1.2)
BILIRUB SERPL-MCNC: 2.3 MG/DL — HIGH (ref 0.2–1.2)
BUN SERPL-MCNC: 6 MG/DL — LOW (ref 10–20)
CALCIUM SERPL-MCNC: 8.6 MG/DL — SIGNIFICANT CHANGE UP (ref 8.4–10.5)
CHLORIDE SERPL-SCNC: 98 MMOL/L — SIGNIFICANT CHANGE UP (ref 98–110)
CO2 SERPL-SCNC: 28 MMOL/L — SIGNIFICANT CHANGE UP (ref 17–32)
CREAT SERPL-MCNC: 0.7 MG/DL — SIGNIFICANT CHANGE UP (ref 0.7–1.5)
EGFR: 110 ML/MIN/1.73M2 — SIGNIFICANT CHANGE UP
EOSINOPHIL # BLD AUTO: 0.4 K/UL — SIGNIFICANT CHANGE UP (ref 0–0.7)
EOSINOPHIL # BLD AUTO: 0.42 K/UL — SIGNIFICANT CHANGE UP (ref 0–0.7)
EOSINOPHIL NFR BLD AUTO: 3.5 % — SIGNIFICANT CHANGE UP (ref 0–8)
EOSINOPHIL NFR BLD AUTO: 3.9 % — SIGNIFICANT CHANGE UP (ref 0–8)
GLUCOSE SERPL-MCNC: 115 MG/DL — HIGH (ref 70–99)
HCT VFR BLD CALC: 30.7 % — LOW (ref 42–52)
HCT VFR BLD CALC: 33.5 % — LOW (ref 42–52)
HGB BLD-MCNC: 10.8 G/DL — LOW (ref 14–18)
HGB BLD-MCNC: 11.4 G/DL — LOW (ref 14–18)
IMM GRANULOCYTES NFR BLD AUTO: 3.9 % — HIGH (ref 0.1–0.3)
IMM GRANULOCYTES NFR BLD AUTO: 6.3 % — HIGH (ref 0.1–0.3)
INR BLD: 1.04 RATIO — SIGNIFICANT CHANGE UP (ref 0.65–1.3)
LYMPHOCYTES # BLD AUTO: 1.97 K/UL — SIGNIFICANT CHANGE UP (ref 1.2–3.4)
LYMPHOCYTES # BLD AUTO: 18.1 % — LOW (ref 20.5–51.1)
LYMPHOCYTES # BLD AUTO: 19.7 % — LOW (ref 20.5–51.1)
LYMPHOCYTES # BLD AUTO: 2.27 K/UL — SIGNIFICANT CHANGE UP (ref 1.2–3.4)
MAGNESIUM SERPL-MCNC: 2.1 MG/DL — SIGNIFICANT CHANGE UP (ref 1.8–2.4)
MCHC RBC-ENTMCNC: 31 PG — SIGNIFICANT CHANGE UP (ref 27–31)
MCHC RBC-ENTMCNC: 31.9 PG — HIGH (ref 27–31)
MCHC RBC-ENTMCNC: 34 G/DL — SIGNIFICANT CHANGE UP (ref 32–37)
MCHC RBC-ENTMCNC: 35.2 G/DL — SIGNIFICANT CHANGE UP (ref 32–37)
MCV RBC AUTO: 90.6 FL — SIGNIFICANT CHANGE UP (ref 80–94)
MCV RBC AUTO: 91 FL — SIGNIFICANT CHANGE UP (ref 80–94)
MONOCYTES # BLD AUTO: 1.35 K/UL — HIGH (ref 0.1–0.6)
MONOCYTES # BLD AUTO: 1.64 K/UL — HIGH (ref 0.1–0.6)
MONOCYTES NFR BLD AUTO: 11.7 % — HIGH (ref 1.7–9.3)
MONOCYTES NFR BLD AUTO: 15 % — HIGH (ref 1.7–9.3)
NEUTROPHILS # BLD AUTO: 6.42 K/UL — SIGNIFICANT CHANGE UP (ref 1.4–6.5)
NEUTROPHILS # BLD AUTO: 6.7 K/UL — HIGH (ref 1.4–6.5)
NEUTROPHILS NFR BLD AUTO: 58.1 % — SIGNIFICANT CHANGE UP (ref 42.2–75.2)
NEUTROPHILS NFR BLD AUTO: 58.8 % — SIGNIFICANT CHANGE UP (ref 42.2–75.2)
NRBC # BLD: 0 /100 WBCS — SIGNIFICANT CHANGE UP (ref 0–0)
NRBC # BLD: 0 /100 WBCS — SIGNIFICANT CHANGE UP (ref 0–0)
PHOSPHATE SERPL-MCNC: 2.4 MG/DL — SIGNIFICANT CHANGE UP (ref 2.1–4.9)
PLATELET # BLD AUTO: 341 K/UL — SIGNIFICANT CHANGE UP (ref 130–400)
PLATELET # BLD AUTO: 458 K/UL — HIGH (ref 130–400)
POTASSIUM SERPL-MCNC: 4 MMOL/L — SIGNIFICANT CHANGE UP (ref 3.5–5)
POTASSIUM SERPL-SCNC: 4 MMOL/L — SIGNIFICANT CHANGE UP (ref 3.5–5)
PROT SERPL-MCNC: 5.4 G/DL — LOW (ref 6–8)
PROTHROM AB SERPL-ACNC: 11.9 SEC — SIGNIFICANT CHANGE UP (ref 9.95–12.87)
RBC # BLD: 3.39 M/UL — LOW (ref 4.7–6.1)
RBC # BLD: 3.68 M/UL — LOW (ref 4.7–6.1)
RBC # FLD: 13.3 % — SIGNIFICANT CHANGE UP (ref 11.5–14.5)
RBC # FLD: 13.5 % — SIGNIFICANT CHANGE UP (ref 11.5–14.5)
SODIUM SERPL-SCNC: 134 MMOL/L — LOW (ref 135–146)
WBC # BLD: 10.9 K/UL — HIGH (ref 4.8–10.8)
WBC # BLD: 11.53 K/UL — HIGH (ref 4.8–10.8)
WBC # FLD AUTO: 10.9 K/UL — HIGH (ref 4.8–10.8)
WBC # FLD AUTO: 11.53 K/UL — HIGH (ref 4.8–10.8)

## 2022-10-02 RX ADMIN — Medication 85 MILLIMOLE(S): at 07:15

## 2022-10-02 RX ADMIN — PIPERACILLIN AND TAZOBACTAM 25 GRAM(S): 4; .5 INJECTION, POWDER, LYOPHILIZED, FOR SOLUTION INTRAVENOUS at 21:16

## 2022-10-02 RX ADMIN — PIPERACILLIN AND TAZOBACTAM 25 GRAM(S): 4; .5 INJECTION, POWDER, LYOPHILIZED, FOR SOLUTION INTRAVENOUS at 05:17

## 2022-10-02 RX ADMIN — PIPERACILLIN AND TAZOBACTAM 25 GRAM(S): 4; .5 INJECTION, POWDER, LYOPHILIZED, FOR SOLUTION INTRAVENOUS at 13:10

## 2022-10-02 RX ADMIN — ENOXAPARIN SODIUM 40 MILLIGRAM(S): 100 INJECTION SUBCUTANEOUS at 16:53

## 2022-10-02 NOTE — PROGRESS NOTE ADULT - SUBJECTIVE AND OBJECTIVE BOX
GENERAL SURGERY PROGRESS NOTE    Patient: SE ESCOBAR , 52y ([birth year])Male   MRN: [id number]  Location: [address]  Visit: 09-27-22 Inpatient  Date: 10-02-22 @ 09:38    Hospital Day #: 6  Post-Op Day #: 4    Procedure/Dx/Injuries: S/P laparoscopic appendectomy    Events of past 24 hours: Patient is doing well. Ambulating, voiding, flatus, bowel movements. No pain, nausea, or vomiting.     PAST MEDICAL & SURGICAL HISTORY:      Vitals:   T(F): 98.1 (10-02-22 @ 08:45), Max: 99.3 (10-01-22 @ 13:00)  HR: 68 (10-02-22 @ 08:45)  BP: 144/64 (10-02-22 @ 08:45)  RR: 18 (10-02-22 @ 08:45)  SpO2: 100% (10-02-22 @ 08:45)          Fluids:     I & O's:    10-01-22 @ 07:01  -  10-02-22 @ 07:00  --------------------------------------------------------  IN:  Total IN: 0 mL    OUT:    Drain (mL): 10 mL    Voided (mL): 1550 mL  Total OUT: 1560 mL    Total NET: -1560 mL        Bowel Movement: : [x] YES [] NO  Flatus: : [x] YES [] NO    PHYSICAL EXAM:  General: NAD, AAOx3, calm and cooperative  HEENT: Scleral icterus, Trachea ML  Cardiac: S1, S2  Respiratory: Normal respiratory effort  Abdomen: Soft, non-distended, non-tender, no rebound, no guarding. +BS.  Skin: Warm/dry  Incision/wound: Dressings in place, clean, dry and intact    MEDICATIONS  (STANDING):  enoxaparin Injectable 40 milliGRAM(s) SubCutaneous every 24 hours  ibuprofen  Tablet. 400 milliGRAM(s) Oral every 8 hours  piperacillin/tazobactam IVPB.. 3.375 Gram(s) IV Intermittent every 8 hours  senna 2 Tablet(s) Oral at bedtime    MEDICATIONS  (PRN):  oxyCODONE    IR 5 milliGRAM(s) Oral every 4 hours PRN Moderate Pain (4 - 6)      DVT PROPHYLAXIS: enoxaparin Injectable 40 milliGRAM(s) SubCutaneous every 24 hours    GI PROPHYLAXIS:   ANTICOAGULATION:   ANTIBIOTICS:  piperacillin/tazobactam IVPB.. 3.375 Gram(s)            LAB/STUDIES:  Labs:  CAPILLARY BLOOD GLUCOSE                              10.8   10.90 )-----------( 341      ( 02 Oct 2022 01:07 )             30.7       Auto Neutrophil %: 58.8 % (10-02-22 @ 01:07)  Auto Immature Granulocyte %: 3.9 % (10-02-22 @ 01:07)    10-02    134<L>  |  98  |  6<L>  ----------------------------<  115<H>  4.0   |  28  |  0.7      Calcium, Total Serum: 8.6 mg/dL (10-02-22 @ 01:07)      LFTs:             5.4  | 2.3  | 51       ------------------[128     ( 02 Oct 2022 01:07 )  2.7  | 1.7  | 52          Lipase:x      Amylase:x               IMAGING:  Nothing in past 24 hours    · Assessment	   52M w/ no PMH/PSH who presents with clinical and radiographic findings consistent with acute, perforated appendicitis. S/P laparoscopic appendectomy (9/28)    Plan:  - Soft diet; tolerating  - LFTs improving  - CYNTHIA serous output- Possible removal 10/3  - Ambulation, IS, IS  - Patient is feeling well and jaundice improving  - Discharge plan for 10/3    Trauma Surgery [id number]

## 2022-10-03 ENCOUNTER — TRANSCRIPTION ENCOUNTER (OUTPATIENT)
Age: 52
End: 2022-10-03

## 2022-10-03 VITALS
RESPIRATION RATE: 18 BRPM | OXYGEN SATURATION: 99 % | DIASTOLIC BLOOD PRESSURE: 78 MMHG | SYSTOLIC BLOOD PRESSURE: 133 MMHG | HEART RATE: 78 BPM | TEMPERATURE: 98 F

## 2022-10-03 LAB
ALBUMIN SERPL ELPH-MCNC: 3 G/DL — LOW (ref 3.5–5.2)
ALP SERPL-CCNC: 144 U/L — HIGH (ref 30–115)
ALT FLD-CCNC: 71 U/L — HIGH (ref 0–41)
ANION GAP SERPL CALC-SCNC: 10 MMOL/L — SIGNIFICANT CHANGE UP (ref 7–14)
AST SERPL-CCNC: 64 U/L — HIGH (ref 0–41)
BILIRUB DIRECT SERPL-MCNC: 1.3 MG/DL — HIGH (ref 0–0.3)
BILIRUB INDIRECT FLD-MCNC: 0.7 MG/DL — SIGNIFICANT CHANGE UP (ref 0.2–1.2)
BILIRUB SERPL-MCNC: 2 MG/DL — HIGH (ref 0.2–1.2)
BUN SERPL-MCNC: 7 MG/DL — LOW (ref 10–20)
CALCIUM SERPL-MCNC: 9.2 MG/DL — SIGNIFICANT CHANGE UP (ref 8.4–10.5)
CHLORIDE SERPL-SCNC: 99 MMOL/L — SIGNIFICANT CHANGE UP (ref 98–110)
CO2 SERPL-SCNC: 28 MMOL/L — SIGNIFICANT CHANGE UP (ref 17–32)
CREAT SERPL-MCNC: 0.7 MG/DL — SIGNIFICANT CHANGE UP (ref 0.7–1.5)
EGFR: 110 ML/MIN/1.73M2 — SIGNIFICANT CHANGE UP
GLUCOSE SERPL-MCNC: 106 MG/DL — HIGH (ref 70–99)
HAPTOGLOB SERPL-MCNC: 423 MG/DL — HIGH (ref 34–200)
LDH SERPL L TO P-CCNC: 191 U/L — SIGNIFICANT CHANGE UP (ref 50–242)
MAGNESIUM SERPL-MCNC: 2.1 MG/DL — SIGNIFICANT CHANGE UP (ref 1.8–2.4)
PHOSPHATE SERPL-MCNC: 3.2 MG/DL — SIGNIFICANT CHANGE UP (ref 2.1–4.9)
POTASSIUM SERPL-MCNC: 4.3 MMOL/L — SIGNIFICANT CHANGE UP (ref 3.5–5)
POTASSIUM SERPL-SCNC: 4.3 MMOL/L — SIGNIFICANT CHANGE UP (ref 3.5–5)
PROT SERPL-MCNC: 5.6 G/DL — LOW (ref 6–8)
SODIUM SERPL-SCNC: 137 MMOL/L — SIGNIFICANT CHANGE UP (ref 135–146)

## 2022-10-03 PROCEDURE — 99024 POSTOP FOLLOW-UP VISIT: CPT

## 2022-10-03 RX ORDER — METRONIDAZOLE 500 MG
1 TABLET ORAL
Qty: 21 | Refills: 0
Start: 2022-10-03 | End: 2022-10-09

## 2022-10-03 RX ADMIN — Medication 400 MILLIGRAM(S): at 13:24

## 2022-10-03 RX ADMIN — PIPERACILLIN AND TAZOBACTAM 25 GRAM(S): 4; .5 INJECTION, POWDER, LYOPHILIZED, FOR SOLUTION INTRAVENOUS at 05:26

## 2022-10-03 NOTE — DISCHARGE NOTE NURSING/CASE MANAGEMENT/SOCIAL WORK - NSDCPEFALRISK_GEN_ALL_CORE
For information on Fall & Injury Prevention, visit: [email or web address] OR  [email or web address] OR  https://www.cdc.gov/steadi/patient.html

## 2022-10-03 NOTE — DISCHARGE NOTE PROVIDER - NSDCFUSCHEDAPPT_GEN_ALL_CORE_FT
Rut Pfeiffer  [facility]  NEUROLOGY [address]  Scheduled Appointment: 11/28/2022

## 2022-10-03 NOTE — DISCHARGE NOTE PROVIDER - NSDCMRMEDTOKEN_GEN_ALL_CORE_FT
levoFLOXacin 500 mg oral tablet: 1 tab(s) orally every 24 hours   metroNIDAZOLE 500 mg oral tablet: 1 tab(s) orally every 8 hours

## 2022-10-03 NOTE — DISCHARGE NOTE NURSING/CASE MANAGEMENT/SOCIAL WORK - PATIENT PORTAL LINK FT
You can access the FollowMyHealth Patient Portal offered by [facility] by registering at the following website: [email or web address]. By joining [email or web address] FollowMyHealth portal, you will also be able to view your health information using other applications (apps) compatible with our system.

## 2022-10-03 NOTE — DISCHARGE NOTE PROVIDER - NSDCCPCAREPLAN_GEN_ALL_CORE_FT
PRINCIPAL DISCHARGE DIAGNOSIS  Diagnosis: Acute appendicitis  Assessment and Plan of Treatment: You came to the hospital and were found to have acute appendicitis.  You were taken to the operating room for a laparoscopic appendectomy (removal of appendix).  You tolerated the procedure well.  If you are in pain: You may take over the counter Tylenol and Motrin every 4-6 hours as needed.  You may leave dressings in place for 48 hours. Dressings are waterproof. You may shower with dressings. Remove in 2 days, leave steri strips in place as they will fall off on their own.   Avoid heavy lifting (>15-20 lbs) for the next 4 weeks.  Please schedule an appointment for follow up with Dr. Mcdonald in 2 weeks. Call to make an appointment.   If you experience severe pain, nausea/vomiting, high fever or shortness of breath, please call the office urgently or return to the ER.

## 2022-10-03 NOTE — DISCHARGE NOTE PROVIDER - HOSPITAL COURSE
52M w/ no PMH/PSH who presents with 3-4 days of acute abdominal pain, initially in the lower quadrants but as of this time more diffuse in all quadrants.  Denies associated N/V.  Has reduced PO intake.  Last BM/flatus 2 days ago.  Describes pain as crampy abdominal pain that radiates to his back.  Reports normal colonoscopy done one year ago.  Seen bedside in ED, AVSS, NAD, AAOx3, conversational.  Abdomen soft, distended, tender to palpation in all quadrants with rebound tenderness, peritonitic.  WBC 6.  CT findings consistent with perforated appendicitis with appendicolith.  Started on IVF, IV Zosyn in the ED, preop labs sent.  Patient underwent laparoscopic appendectomy. Operative findings are as follows: perforated, gangrenous appendix with diffuse peritonitis. 60mm purple staple deployed across distal cecum x 2 loads. CYNTHIA drain placed in RLQ.   Patient transferred to the floor post op. Patient tolerated procedure well with no complications.  Hepatology consulted for hyperbilirubinemia. Recommended haptoglobin, LDH. Increase in direct bili likely due to DILI superimposed on underlying possible Gilbert's syndrome.   Patient is ambulating, voiding, passing flatus, having BM, tolerating diet and pain is controlled. Patient is stable for discharge.

## 2022-10-03 NOTE — PROGRESS NOTE ADULT - SUBJECTIVE AND OBJECTIVE BOX
GENERAL SURGERY PROGRESS NOTE    Patient: SE ESCOBAR , 52y ([birth year])Male   MRN: [id number]  Location: [address]  Visit: 09-27-22 Inpatient  Date: 10-03-22 @ 08:22    Procedure/Dx/Injuries: s/p laparoscopic appendectomy    Events of past 24 hours: Patient seen and examined at bedside. Patient is doing well, tolerating his diet without nausea or vomiting. Patient is passing flatus and having bowel movements. Patient's pain is well controlled, and he is ambulating and voiding. LFT's are downtrending.     PAST MEDICAL & SURGICAL HISTORY:      Vitals:   T(F): 98.4 (10-03-22 @ 05:10), Max: 98.5 (10-02-22 @ 21:48)  HR: 65 (10-03-22 @ 05:10)  BP: 120/78 (10-03-22 @ 05:10)  RR: 18 (10-03-22 @ 05:10)  SpO2: 99% (10-03-22 @ 05:10)          Fluids:     I & O's:    10-02-22 @ 07:01  -  10-03-22 @ 07:00  --------------------------------------------------------  IN:  Total IN: 0 mL    OUT:    Drain (mL): 5 mL    Voided (mL): 2400 mL  Total OUT: 2405 mL    Total NET: -2405 mL        Bowel Movement: : [x] YES [] NO  Flatus: : [x] YES [] NO    PHYSICAL EXAM:  General: NAD, AAOx3, calm and cooperative  HEENT: Scleral icterus, Trachea ML  Cardiac: S1, S2  Respiratory: Normal respiratory effort  Abdomen: Soft, non-distended, non-tender, no rebound, no guarding. +BS. CYNTHIA drain in place draining 10 cc serosanguinous more serous, output.   Skin: Warm/dry  Incision/wound: Dressings in place, clean, dry and intact      MEDICATIONS  (STANDING):  enoxaparin Injectable 40 milliGRAM(s) SubCutaneous every 24 hours  ibuprofen  Tablet. 400 milliGRAM(s) Oral every 8 hours  piperacillin/tazobactam IVPB.. 3.375 Gram(s) IV Intermittent every 8 hours  senna 2 Tablet(s) Oral at bedtime    MEDICATIONS  (PRN):  oxyCODONE    IR 5 milliGRAM(s) Oral every 4 hours PRN Moderate Pain (4 - 6)      DVT PROPHYLAXIS: enoxaparin Injectable 40 milliGRAM(s) SubCutaneous every 24 hours    GI PROPHYLAXIS:   ANTICOAGULATION:   ANTIBIOTICS:  piperacillin/tazobactam IVPB.. 3.375 Gram(s)            LAB/STUDIES:  Labs:  CAPILLARY BLOOD GLUCOSE                              11.4   11.53 )-----------( 458      ( 02 Oct 2022 22:00 )             33.5       Auto Neutrophil %: 58.1 % (10-02-22 @ 22:00)  Auto Immature Granulocyte %: 6.3 % (10-02-22 @ 22:00)    10-02    137  |  99  |  7<L>  ----------------------------<  106<H>  4.3   |  28  |  0.7      Calcium, Total Serum: 9.2 mg/dL (10-02-22 @ 22:00)      LFTs:             5.6  | 2.0  | 64       ------------------[144     ( 02 Oct 2022 22:00 )  3.0  | 1.3  | 71          Lipase:x      Amylase:x             Coags:     11.90  ----< 1.04    ( 02 Oct 2022 22:00 )     x           IMAGING:  No new imaging today.

## 2022-10-03 NOTE — PROGRESS NOTE ADULT - ASSESSMENT
52M w/ no PMH/PSH who presents with clinical and radiographic findings consistent with acute, perforated appendicitis. S/P laparoscopic appendectomy (9/28)    Plan:  - Soft diet; tolerating  - LFTs improving  - CYNTHIA serous output- Possible removal 10/3  - Ambulation, IS,   - Patient is feeling well and jaundice improving  - Monitor labs - replete as needed  - Monitor vitals  - Discharge plan for 10/3    Trauma Surgery 0741
 ASSESSMENT:   52M w/ no PMH/PSH who presents with clinical and radiographic findings consistent with acute, perforated appendicitis. S/P laparoscopic appendectomy (9/28)    Plan:  - Regular diet; tolerating  - LFTs improving  - CYNTHIA serous output  - Ambulation encouraged  - Encourage IS    Trauma Surgery 2060        
52M w/ no PMH/PSH who presents with clinical and radiographic findings consistent with acute, perforated appendicitis. S/P laparoscopic appendectomy (9/28)    Plan:  - Sips and chips  -Ambulation encouraged  -RUQ sonogram done- negative  -Will follow hepatology recommendations- labs ordered for tonight  -Follow up bowel function  -continue to monitor LFTs    [id number]
52M w/ no PMH/PSH who presents with clinical and radiographic findings consistent with acute, perforated appendicitis. S/P laparoscopic appendectomy (9/28)    Plan:  - Sips and chips  -Ambulation encouraged  -RUQ sonogram done- negative  -Will follow hepatology recommendations- labs ordered for tonight -   -bowel function returned  -continue to monitor LFTs - TBili 4.6    Trauma Surgery 8210
52M w/ no PMH/PSH who presents with 3-4 days of acute abdominal pain, initially in the lower quadrants but as of this time more diffuse in all quadrants with reduced PO intake admitted for perforated appendicitis on 9/27 went for OR on 9/28. hepatology was consulted for elevated T.bili.     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

## 2022-10-03 NOTE — DISCHARGE NOTE PROVIDER - CARE PROVIDER_API CALL
Jacquelyn Mcdonald)  Surgery; Surgical Critical Care  [facility], [address] [place] [id number]  Phone: [phone]  Fax: [phone]  Follow Up Time: 2 weeks

## 2022-10-03 NOTE — PROGRESS NOTE ADULT - REASON FOR ADMISSION
acute appendicitis

## 2022-10-04 ENCOUNTER — NON-APPOINTMENT (OUTPATIENT)
Age: 52
End: 2022-10-04

## 2022-10-08 ENCOUNTER — INPATIENT (INPATIENT)
Facility: HOSPITAL | Age: 52
LOS: 2 days | Discharge: HOME | End: 2022-10-11
Attending: SURGERY | Admitting: SURGERY

## 2022-10-08 VITALS
RESPIRATION RATE: 18 BRPM | HEIGHT: 69 IN | TEMPERATURE: 98 F | WEIGHT: 179.9 LBS | OXYGEN SATURATION: 99 % | DIASTOLIC BLOOD PRESSURE: 75 MMHG | HEART RATE: 82 BPM | SYSTOLIC BLOOD PRESSURE: 128 MMHG

## 2022-10-08 DIAGNOSIS — Z90.49 ACQUIRED ABSENCE OF OTHER SPECIFIED PARTS OF DIGESTIVE TRACT: Chronic | ICD-10-CM

## 2022-10-08 LAB
ALBUMIN SERPL ELPH-MCNC: 3.7 G/DL — SIGNIFICANT CHANGE UP (ref 3.5–5.2)
ALP SERPL-CCNC: 139 U/L — HIGH (ref 30–115)
ALT FLD-CCNC: 153 U/L — HIGH (ref 0–41)
ANION GAP SERPL CALC-SCNC: 8 MMOL/L — SIGNIFICANT CHANGE UP (ref 7–14)
APTT BLD: 34.5 SEC — SIGNIFICANT CHANGE UP (ref 27–39.2)
AST SERPL-CCNC: 64 U/L — HIGH (ref 0–41)
BASOPHILS # BLD AUTO: 0.06 K/UL — SIGNIFICANT CHANGE UP (ref 0–0.2)
BASOPHILS NFR BLD AUTO: 0.4 % — SIGNIFICANT CHANGE UP (ref 0–1)
BILIRUB DIRECT SERPL-MCNC: 0.4 MG/DL — HIGH (ref 0–0.3)
BILIRUB INDIRECT FLD-MCNC: 0.5 MG/DL — SIGNIFICANT CHANGE UP (ref 0.2–1.2)
BILIRUB SERPL-MCNC: 0.9 MG/DL — SIGNIFICANT CHANGE UP (ref 0.2–1.2)
BUN SERPL-MCNC: 9 MG/DL — LOW (ref 10–20)
CALCIUM SERPL-MCNC: 9.7 MG/DL — SIGNIFICANT CHANGE UP (ref 8.4–10.5)
CHLORIDE SERPL-SCNC: 101 MMOL/L — SIGNIFICANT CHANGE UP (ref 98–110)
CO2 SERPL-SCNC: 28 MMOL/L — SIGNIFICANT CHANGE UP (ref 17–32)
CREAT SERPL-MCNC: 0.8 MG/DL — SIGNIFICANT CHANGE UP (ref 0.7–1.5)
D DIMER BLD IA.RAPID-MCNC: 883 NG/ML DDU — HIGH (ref 0–230)
EGFR: 106 ML/MIN/1.73M2 — SIGNIFICANT CHANGE UP
EOSINOPHIL # BLD AUTO: 0.1 K/UL — SIGNIFICANT CHANGE UP (ref 0–0.7)
EOSINOPHIL NFR BLD AUTO: 0.6 % — SIGNIFICANT CHANGE UP (ref 0–8)
GLUCOSE SERPL-MCNC: 94 MG/DL — SIGNIFICANT CHANGE UP (ref 70–99)
HCT VFR BLD CALC: 36.6 % — LOW (ref 42–52)
HGB BLD-MCNC: 12.3 G/DL — LOW (ref 14–18)
IMM GRANULOCYTES NFR BLD AUTO: 1.3 % — HIGH (ref 0.1–0.3)
INR BLD: 1.01 RATIO — SIGNIFICANT CHANGE UP (ref 0.65–1.3)
LIDOCAIN IGE QN: 64 U/L — HIGH (ref 7–60)
LYMPHOCYTES # BLD AUTO: 11.9 % — LOW (ref 20.5–51.1)
LYMPHOCYTES # BLD AUTO: 2 K/UL — SIGNIFICANT CHANGE UP (ref 1.2–3.4)
MAGNESIUM SERPL-MCNC: 2.2 MG/DL — SIGNIFICANT CHANGE UP (ref 1.8–2.4)
MCHC RBC-ENTMCNC: 31.8 PG — HIGH (ref 27–31)
MCHC RBC-ENTMCNC: 33.6 G/DL — SIGNIFICANT CHANGE UP (ref 32–37)
MCV RBC AUTO: 94.6 FL — HIGH (ref 80–94)
MONOCYTES # BLD AUTO: 1.21 K/UL — HIGH (ref 0.1–0.6)
MONOCYTES NFR BLD AUTO: 7.2 % — SIGNIFICANT CHANGE UP (ref 1.7–9.3)
NEUTROPHILS # BLD AUTO: 13.19 K/UL — HIGH (ref 1.4–6.5)
NEUTROPHILS NFR BLD AUTO: 78.6 % — HIGH (ref 42.2–75.2)
NRBC # BLD: 0 /100 WBCS — SIGNIFICANT CHANGE UP (ref 0–0)
NT-PROBNP SERPL-SCNC: 67 PG/ML — SIGNIFICANT CHANGE UP (ref 0–300)
PLATELET # BLD AUTO: 790 K/UL — HIGH (ref 130–400)
POTASSIUM SERPL-MCNC: 5.3 MMOL/L — HIGH (ref 3.5–5)
POTASSIUM SERPL-SCNC: 5.3 MMOL/L — HIGH (ref 3.5–5)
PROT SERPL-MCNC: 6.7 G/DL — SIGNIFICANT CHANGE UP (ref 6–8)
PROTHROM AB SERPL-ACNC: 11.5 SEC — SIGNIFICANT CHANGE UP (ref 9.95–12.87)
RBC # BLD: 3.87 M/UL — LOW (ref 4.7–6.1)
RBC # FLD: 14.6 % — HIGH (ref 11.5–14.5)
SARS-COV-2 RNA SPEC QL NAA+PROBE: SIGNIFICANT CHANGE UP
SODIUM SERPL-SCNC: 137 MMOL/L — SIGNIFICANT CHANGE UP (ref 135–146)
TROPONIN T SERPL-MCNC: <0.01 NG/ML — SIGNIFICANT CHANGE UP
WBC # BLD: 16.77 K/UL — HIGH (ref 4.8–10.8)
WBC # FLD AUTO: 16.77 K/UL — HIGH (ref 4.8–10.8)

## 2022-10-08 PROCEDURE — 71275 CT ANGIOGRAPHY CHEST: CPT | Mod: 26,MA

## 2022-10-08 PROCEDURE — 74177 CT ABD & PELVIS W/CONTRAST: CPT | Mod: 26,MA

## 2022-10-08 PROCEDURE — 93010 ELECTROCARDIOGRAM REPORT: CPT

## 2022-10-08 PROCEDURE — 99285 EMERGENCY DEPT VISIT HI MDM: CPT

## 2022-10-08 PROCEDURE — 71045 X-RAY EXAM CHEST 1 VIEW: CPT | Mod: 26

## 2022-10-08 RX ORDER — PIPERACILLIN AND TAZOBACTAM 4; .5 G/20ML; G/20ML
3.38 INJECTION, POWDER, LYOPHILIZED, FOR SOLUTION INTRAVENOUS ONCE
Refills: 0 | Status: COMPLETED | OUTPATIENT
Start: 2022-10-09 | End: 2022-10-09

## 2022-10-08 RX ORDER — KETOROLAC TROMETHAMINE 30 MG/ML
15 SYRINGE (ML) INJECTION EVERY 8 HOURS
Refills: 0 | Status: DISCONTINUED | OUTPATIENT
Start: 2022-10-08 | End: 2022-10-11

## 2022-10-08 RX ORDER — CEFEPIME 1 G/1
2000 INJECTION, POWDER, FOR SOLUTION INTRAMUSCULAR; INTRAVENOUS ONCE
Refills: 0 | Status: DISCONTINUED | OUTPATIENT
Start: 2022-10-08 | End: 2022-10-08

## 2022-10-08 RX ORDER — PIPERACILLIN AND TAZOBACTAM 4; .5 G/20ML; G/20ML
3.38 INJECTION, POWDER, LYOPHILIZED, FOR SOLUTION INTRAVENOUS ONCE
Refills: 0 | Status: COMPLETED | OUTPATIENT
Start: 2022-10-08 | End: 2022-10-08

## 2022-10-08 RX ORDER — ACETAMINOPHEN 500 MG
650 TABLET ORAL EVERY 6 HOURS
Refills: 0 | Status: DISCONTINUED | OUTPATIENT
Start: 2022-10-08 | End: 2022-10-11

## 2022-10-08 RX ORDER — VANCOMYCIN HCL 1 G
1000 VIAL (EA) INTRAVENOUS ONCE
Refills: 0 | Status: DISCONTINUED | OUTPATIENT
Start: 2022-10-08 | End: 2022-10-08

## 2022-10-08 RX ORDER — PIPERACILLIN AND TAZOBACTAM 4; .5 G/20ML; G/20ML
3.38 INJECTION, POWDER, LYOPHILIZED, FOR SOLUTION INTRAVENOUS EVERY 8 HOURS
Refills: 0 | Status: DISCONTINUED | OUTPATIENT
Start: 2022-10-09 | End: 2022-10-11

## 2022-10-08 RX ORDER — MORPHINE SULFATE 50 MG/1
4 CAPSULE, EXTENDED RELEASE ORAL ONCE
Refills: 0 | Status: DISCONTINUED | OUTPATIENT
Start: 2022-10-08 | End: 2022-10-08

## 2022-10-08 RX ADMIN — MORPHINE SULFATE 4 MILLIGRAM(S): 50 CAPSULE, EXTENDED RELEASE ORAL at 15:49

## 2022-10-08 RX ADMIN — PIPERACILLIN AND TAZOBACTAM 200 GRAM(S): 4; .5 INJECTION, POWDER, LYOPHILIZED, FOR SOLUTION INTRAVENOUS at 23:56

## 2022-10-08 NOTE — H&P ADULT - ASSESSMENT
52yMale with hx of perforated appendicitis with feculent peritonitis, presenting with pelvic abscess    Plan is  as follows:    -Admit to Surgery, admitting attending Dr. Mcdonald  -Pain Control: Multimodal with Tylenol 650mg q6hrs/Ibuprofen  -CVS: no active issues  -Pulmonary: Incentive Spirometry q1 hr while awake  -GI/FEN: , Monitor Strict Intake/Output [id number], Replete Electrolytes PRN,   -Renal: BUN/Cr- 9/0.8    -ABX: piperacillin/tazobactam IVPB  -PPX: GI ppx not indicated, DVT ppx scds pending IR intervention  -Activity - Ambulate as Tolerated:     Time/Priority:  Routine  Activity - Ambulate as Tolerated:     Time/Priority:  Routine  Activity - Ambulate as tolerated    -Disposition: MED/SURG FLOOR  -Additional Consultations: Interventional Radiology

## 2022-10-08 NOTE — H&P ADULT - HISTORY OF PRESENT ILLNESS
52y M with no significant PMH, recent hx of perforated appendicitis with feculent peritonitis s/p laparoscopic appendectomy on 09/28 presents with Right subdiaphragmatic pain. His postoperative course was complicated by LFT elevation and was seen by GI, attributed to Gilbert's syndrome, and was discharged home on PO Levaquin/Flagyl on 10/3. He states his pain began about 3 days ago and has been worsening and now experiences severe sharp pain when taking deep breaths. Denies fevers/chills, or PO intolerance. Denies Nausea/vomiting/diarrhea.

## 2022-10-08 NOTE — ED ADULT TRIAGE NOTE - NS ED TRIAGE EKG
normal appearance , without tenderness upon palpation , no deformities , trachea midline , Thyroid normal size , no thyroid nodules , no masses , no JVD , thyroid nontender
EKG completed

## 2022-10-08 NOTE — ED PROVIDER NOTE - OBJECTIVE STATEMENT
51 yo male, psh of [facility], [place] 4 days ago, presents to ed for right sided cp, lower ribs, mild, aching, no radiation, worse with deep inspiration. Denies fever, chills, sob, abd pain, nvd, dizziness, syncope.

## 2022-10-08 NOTE — ED ADULT NURSE REASSESSMENT NOTE - NS ED NURSE REASSESS COMMENT FT1
Patient assessed bedside.  A/O x 4, family bedside.  No acute pain or distress at this time.  Cardiac monitoring ongoing.  Pt awaiting CT read.  Patient safety and comfort maintained.

## 2022-10-08 NOTE — ED PROVIDER NOTE - ATTENDING APP SHARED VISIT CONTRIBUTION OF CARE
53 yo m with no reported pmh, recent perforated appendicitis (with Dr. Connors), presents with c/o R lower chest pain.  pt says started today.  no cough.  no fever no chills.  denies vomiting or diarrhea.  pt is currently on levaquin  and flagyl.  no urinary sx.  exam: nad, ncat, perrl, eomi, mmm, rrr, ctab, abd soft, nt, nd aox3, imp: pt with recent perf appy, now with R lower chest pain, r/o pe, cta, ct abd

## 2022-10-08 NOTE — H&P ADULT - NSHPPHYSICALEXAM_GEN_ALL_CORE
General: Awake, alert, Oriented to person/place/time. No acute distress  Neuro: CN II-XII grossly intact, no focal deficits  HEENT: EOMI, No scleral icterus. Trachea midline  Lung: Non-labored on room air, no wheezes/rhonchi  Cardiovascular: RRR by radial pulse, normotensive. No Jugular venous distension  Abdomen: Soft, non-tender, non-distended. No masses or organomegaly. + Tenderness at left costal margin no rebound or guarding.   Extremities: no clubbing/cyanosis/edema  Skin: warm and well perfused. Capillary refill < 2 seconds  Psych: Appropriate mood and affect

## 2022-10-08 NOTE — ED PROVIDER NOTE - CLINICAL SUMMARY MEDICAL DECISION MAKING FREE TEXT BOX
Post-operative intraabdominal abscess.  Will admit to surgery for IV antibiotics and further management.

## 2022-10-08 NOTE — H&P ADULT - NSHPLABSRESULTS_GEN_ALL_CORE
12.3   16.77 )-----------( 790      ( 08 Oct 2022 16:00 )             36.6     10-08    137  |  101  |  9<L>  ----------------------------<  94  5.3<H>   |  28  |  0.8    Ca    9.7      08 Oct 2022 16:00  Mg     2.2     10-08    TPro  6.7  /  Alb  3.7  /  TBili  0.9  /  DBili  0.4<H>  /  AST  64<H>  /  ALT  153<H>  /  AlkPhos  139<H>  10-08    < from: CT Abdomen and Pelvis w/ IV Cont (10.08.22 @ 18:17) >    IMPRESSION:    No pulmonary emboli.    New pelvic rim-enhancing fluid collection anterior to the rectum,   measuring approximately 5.1 x 4.0 x 3.3 cm, consistent with abscess   formation. A call back request was submitted    < end of copied text >

## 2022-10-08 NOTE — ED ADULT NURSE NOTE - NSFALLRSKASSESSTYPE_ED_ALL_ED
Initial (On Arrival) Spironolactone Counseling: Patient advised regarding risks of diarrhea, abdominal pain, hyperkalemia, birth defects (for female patients), liver toxicity and renal toxicity. The patient may need blood work to monitor liver and kidney function and potassium levels while on therapy. The patient verbalized understanding of the proper use and possible adverse effects of spironolactone.  All of the patient's questions and concerns were addressed.

## 2022-10-08 NOTE — H&P ADULT - ATTENDING COMMENTS
This is 51 y/o male with no significant PMH, recent hx of perforated appendicitis with feculent peritonitis s/p laparoscopic appendectomy on 09/28 presents with Right subdiaphragmatic pain. His postoperative course was complicated by LFT elevation and was seen by GI, attributed to Gilbert's syndrome, and was discharged home on PO Levaquin/Flagyl on 10/3. He complains of right chest pain. He states his pain began about 3 days ago and has been worsening and now experiences severe sharp pain when taking deep breaths. Denies fevers/chills, or PO intolerance. Denies Nausea/vomiting/diarrhea.  No abdominal pain, tolerates po diet and has regular bowel movements.    PE:  AAO x3  Chest: clera.  CV ; rrr  Abdomen: soft, nontender  Extr: no edema.    CT was reviewed - no PE; has pelvic collection 5x4 cm    ASSESSMENT:  51 y/o male, S/P Acute Appendicitis with Diffuse Peritonitis.  S/P Laparoscopic Appendectomy.  Now with Pelvic Abscess.    PLAN:  - regular diet  - put on iv Zosyn  - ID eval for further Antibiotic treatment  - follow WBC  - DVT prophylaxis

## 2022-10-09 DIAGNOSIS — E80.4 GILBERT SYNDROME: ICD-10-CM

## 2022-10-09 DIAGNOSIS — T36.95XA ADVERSE EFFECT OF UNSPECIFIED SYSTEMIC ANTIBIOTIC, INITIAL ENCOUNTER: ICD-10-CM
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
## 2022-10-09 DIAGNOSIS — K35.20 ACUTE APPENDICITIS WITH GENERALIZED PERITONITIS, WITHOUT ABSCESS: ICD-10-CM

## 2022-10-09 DIAGNOSIS — K71.0 TOXIC LIVER DISEASE WITH CHOLESTASIS: ICD-10-CM

## 2022-10-09 LAB
ANION GAP SERPL CALC-SCNC: 7 MMOL/L — SIGNIFICANT CHANGE UP (ref 7–14)
BASOPHILS # BLD AUTO: 0.05 K/UL — SIGNIFICANT CHANGE UP (ref 0–0.2)
BASOPHILS NFR BLD AUTO: 0.4 % — SIGNIFICANT CHANGE UP (ref 0–1)
BUN SERPL-MCNC: 12 MG/DL — SIGNIFICANT CHANGE UP (ref 10–20)
CALCIUM SERPL-MCNC: 10.1 MG/DL — SIGNIFICANT CHANGE UP (ref 8.4–10.5)
CHLORIDE SERPL-SCNC: 99 MMOL/L — SIGNIFICANT CHANGE UP (ref 98–110)
CO2 SERPL-SCNC: 28 MMOL/L — SIGNIFICANT CHANGE UP (ref 17–32)
CREAT SERPL-MCNC: 0.9 MG/DL — SIGNIFICANT CHANGE UP (ref 0.7–1.5)
EGFR: 102 ML/MIN/1.73M2 — SIGNIFICANT CHANGE UP
EOSINOPHIL # BLD AUTO: 0.2 K/UL — SIGNIFICANT CHANGE UP (ref 0–0.7)
EOSINOPHIL NFR BLD AUTO: 1.7 % — SIGNIFICANT CHANGE UP (ref 0–8)
GLUCOSE SERPL-MCNC: 95 MG/DL — SIGNIFICANT CHANGE UP (ref 70–99)
HCT VFR BLD CALC: 34.6 % — LOW (ref 42–52)
HGB BLD-MCNC: 11.7 G/DL — LOW (ref 14–18)
IMM GRANULOCYTES NFR BLD AUTO: 1.4 % — HIGH (ref 0.1–0.3)
INR BLD: 1.08 RATIO — SIGNIFICANT CHANGE UP (ref 0.65–1.3)
LYMPHOCYTES # BLD AUTO: 17.6 % — LOW (ref 20.5–51.1)
LYMPHOCYTES # BLD AUTO: 2.03 K/UL — SIGNIFICANT CHANGE UP (ref 1.2–3.4)
MAGNESIUM SERPL-MCNC: 2.2 MG/DL — SIGNIFICANT CHANGE UP (ref 1.8–2.4)
MCHC RBC-ENTMCNC: 31.8 PG — HIGH (ref 27–31)
MCHC RBC-ENTMCNC: 33.8 G/DL — SIGNIFICANT CHANGE UP (ref 32–37)
MCV RBC AUTO: 94 FL — SIGNIFICANT CHANGE UP (ref 80–94)
MONOCYTES # BLD AUTO: 1.45 K/UL — HIGH (ref 0.1–0.6)
MONOCYTES NFR BLD AUTO: 12.6 % — HIGH (ref 1.7–9.3)
NEUTROPHILS # BLD AUTO: 7.64 K/UL — HIGH (ref 1.4–6.5)
NEUTROPHILS NFR BLD AUTO: 66.3 % — SIGNIFICANT CHANGE UP (ref 42.2–75.2)
NRBC # BLD: 0 /100 WBCS — SIGNIFICANT CHANGE UP (ref 0–0)
PHOSPHATE SERPL-MCNC: 4.7 MG/DL — SIGNIFICANT CHANGE UP (ref 2.1–4.9)
PLATELET # BLD AUTO: 679 K/UL — HIGH (ref 130–400)
POTASSIUM SERPL-MCNC: 4.9 MMOL/L — SIGNIFICANT CHANGE UP (ref 3.5–5)
POTASSIUM SERPL-SCNC: 4.9 MMOL/L — SIGNIFICANT CHANGE UP (ref 3.5–5)
PROTHROM AB SERPL-ACNC: 12.3 SEC — SIGNIFICANT CHANGE UP (ref 9.95–12.87)
RBC # BLD: 3.68 M/UL — LOW (ref 4.7–6.1)
RBC # FLD: 14.6 % — HIGH (ref 11.5–14.5)
SODIUM SERPL-SCNC: 134 MMOL/L — LOW (ref 135–146)
WBC # BLD: 11.53 K/UL — HIGH (ref 4.8–10.8)
WBC # FLD AUTO: 11.53 K/UL — HIGH (ref 4.8–10.8)

## 2022-10-09 PROCEDURE — 99222 1ST HOSP IP/OBS MODERATE 55: CPT | Mod: 24

## 2022-10-09 RX ORDER — ENOXAPARIN SODIUM 100 MG/ML
40 INJECTION SUBCUTANEOUS EVERY 24 HOURS
Refills: 0 | Status: DISCONTINUED | OUTPATIENT
Start: 2022-10-09 | End: 2022-10-11

## 2022-10-09 RX ADMIN — Medication 650 MILLIGRAM(S): at 23:13

## 2022-10-09 RX ADMIN — PIPERACILLIN AND TAZOBACTAM 25 GRAM(S): 4; .5 INJECTION, POWDER, LYOPHILIZED, FOR SOLUTION INTRAVENOUS at 05:20

## 2022-10-09 RX ADMIN — PIPERACILLIN AND TAZOBACTAM 25 GRAM(S): 4; .5 INJECTION, POWDER, LYOPHILIZED, FOR SOLUTION INTRAVENOUS at 17:16

## 2022-10-09 RX ADMIN — Medication 650 MILLIGRAM(S): at 17:31

## 2022-10-09 RX ADMIN — ENOXAPARIN SODIUM 40 MILLIGRAM(S): 100 INJECTION SUBCUTANEOUS at 17:16

## 2022-10-09 RX ADMIN — Medication 650 MILLIGRAM(S): at 17:29

## 2022-10-09 NOTE — PROGRESS NOTE ADULT - ASSESSMENT
52yMale with hx of perforated appendicitis with feculent peritonitis, presenting with pelvic abscess    Plan:  -Pain Control: Multimodal with Tylenol 650mg q6hrs/Ibuprofen  -Encourage incentive spirometry q1 hr while awake  -Monitor Strict Intake/Output [id number], Replete Electrolytes PRN  -C/w piperacillin/tazobactam IVPB  -F/u IR consult for possible drainage    [id number]

## 2022-10-09 NOTE — PATIENT PROFILE ADULT - FALL HARM RISK - UNIVERSAL INTERVENTIONS
Bed in lowest position, wheels locked, appropriate side rails in place/Call bell, personal items and telephone in reach/Instruct patient to call for assistance before getting out of bed or chair/Non-slip footwear when patient is out of bed/[place] to call system/Physically safe environment - no spills, clutter or unnecessary equipment/Purposeful Proactive Rounding/Room/bathroom lighting operational, light cord in reach

## 2022-10-09 NOTE — PATIENT PROFILE ADULT - BRAND OF FIRST COVID-19 BOOSTER
Spoke with Jayna MADDOX regarding air leak in chest tube. At this time the chest tube will remain as is.    [employer]

## 2022-10-10 PROCEDURE — 99024 POSTOP FOLLOW-UP VISIT: CPT

## 2022-10-10 RX ADMIN — PIPERACILLIN AND TAZOBACTAM 25 GRAM(S): 4; .5 INJECTION, POWDER, LYOPHILIZED, FOR SOLUTION INTRAVENOUS at 00:48

## 2022-10-10 RX ADMIN — PIPERACILLIN AND TAZOBACTAM 25 GRAM(S): 4; .5 INJECTION, POWDER, LYOPHILIZED, FOR SOLUTION INTRAVENOUS at 17:21

## 2022-10-10 RX ADMIN — PIPERACILLIN AND TAZOBACTAM 25 GRAM(S): 4; .5 INJECTION, POWDER, LYOPHILIZED, FOR SOLUTION INTRAVENOUS at 10:00

## 2022-10-10 RX ADMIN — ENOXAPARIN SODIUM 40 MILLIGRAM(S): 100 INJECTION SUBCUTANEOUS at 17:20

## 2022-10-10 NOTE — CONSULT NOTE ADULT - SUBJECTIVE AND OBJECTIVE BOX
Patient is a 52y old  Male who presents with a chief complaint of Pelvic Abscess (10 Oct 2022 03:23)     52y M with no significant PMH, recent hx of perforated appendicitis with feculent peritonitis s/p laparoscopic appendectomy on 09/28 presents with Right subdiaphragmatic pain. His postoperative course was complicated by LFT elevation and was seen by GI, attributed to Gilbert's syndrome, and was discharged home on PO Levaquin/Flagyl on 10/3. He states his pain began about 3 days ago and has been worsening and now experiences severe sharp pain when taking deep breaths. Denies fevers/chills, or PO intolerance. Denies Nausea/vomiting/diarrhea.         REVIEW OF SYSTEMS: Total of twelve systems have been reviewed and found to be negative unless mentioned in HPI        PAST MEDICAL & SURGICAL HISTORY:  S/P laparoscopic appendectomy      SOCIAL HISTORY  Alcohol: Does not drink  Tobacco: Does not smoke  Illicit substance use: None      FAMILY HISTORY: Non contributory to the present illness        ALLERGIES: No Known Allergies        Vital Signs Last 24 Hrs  T(C): 36.4 (10 Oct 2022 05:00), Max: 36.7 (09 Oct 2022 16:52)  T(F): 97.6 (10 Oct 2022 05:00), Max: 98 (09 Oct 2022 16:52)  HR: 71 (10 Oct 2022 05:00) (64 - 84)  BP: 104/64 (10 Oct 2022 05:00) (104/64 - 126/75)  BP(mean): --  RR: 18 (10 Oct 2022 05:00) (18 - 19)  SpO2: 97% (10 Oct 2022 05:00) (95% - 98%)    Parameters below as of 10 Oct 2022 05:00  Patient On (Oxygen Delivery Method): room air        PHYSICAL EXAM:  GENERAL: Not in distress   CHEST/LUNG:  Aire ntry bilaterally  HEART: s1 and s2 present  ABDOMEN:  Nontender and  Nondistended  EXTREMITIES: No pedal  edema  CNS: Awake and Alert      LABS:                        11.7   11.53 )-----------( 679      ( 09 Oct 2022 21:58 )             34.6     10-09    134<L>  |  99  |  12  ----------------------------<  95  4.9   |  28  |  0.9    Ca    10.1      09 Oct 2022 21:58  Phos  4.7     10-09  Mg     2.2     10-09    TPro  6.7  /  Alb  3.7  /  TBili  0.9  /  DBili  0.4<H>  /  AST  64<H>  /  ALT  153<H>  /  AlkPhos  139<H>  10-08    PT/INR - ( 09 Oct 2022 21:58 )   PT: 12.30 sec;   INR: 1.08 ratio         PTT - ( 08 Oct 2022 17:17 )  PTT:34.5 sec        MEDICATIONS  (STANDING):  acetaminophen     Tablet .. 650 milliGRAM(s) Oral every 6 hours  enoxaparin Injectable 40 milliGRAM(s) SubCutaneous every 24 hours  piperacillin/tazobactam IVPB.. 3.375 Gram(s) IV Intermittent every 8 hours    MEDICATIONS  (PRN):  ketorolac   Injectable 15 milliGRAM(s) IV Push every 8 hours PRN Moderate Pain (4 - 6)        RADIOLOGY & ADDITIONAL TESTS:    < from: CT Abdomen and Pelvis w/ IV Cont (10.08.22 @ 18:17) >  No pulmonary emboli.    New pelvic rim-enhancing fluid collection anterior to the rectum,   measuring approximately 5.1 x 4.0 x 3.3 cm, consistent with abscess   formation. A call back request was submitted    < end of copied text >    COVID-19 PCR . (10.08.22 @ 18:00)   COVID-19 PCR: NotDetec:           Patient is a 52y old  Male with no significant PMH, recent hx of perforated appendicitis with feculent peritonitis s/p laparoscopic appendectomy on 09/28, now presents to the ER for evaluation of Right subdiaphragmatic pain. His postoperative course was complicated by LFT elevation and was seen by GI, attributed to Gilbert's syndrome, and was discharged home on PO Levaquin/Flagyl on 10/3. He states his pain began about 3 days ago and has been worsening and now experiences severe sharp pain when taking deep breaths. On admission, he found to have no fever but Leukocytosis. The CT abd/pelvis shows New pelvic rim-enhancing fluid collection anterior to the rectum, measuring approximately 5.1 x 4.0 x 3.3 cm, consistent with abscess formation. He has started on Zosyn and the ID consult requested to assist with further evaluation and antibiotic management.        REVIEW OF SYSTEMS: Total of twelve systems have been reviewed and found to be negative unless mentioned in HPI        PAST MEDICAL & SURGICAL HISTORY:  S/P laparoscopic appendectomy      SOCIAL HISTORY  Alcohol: Does not drink  Tobacco: Does not smoke  Illicit substance use: None      FAMILY HISTORY: Non contributory to the present illness        ALLERGIES: No Known Allergies        Vital Signs Last 24 Hrs  T(C): 36.4 (10 Oct 2022 05:00), Max: 36.7 (09 Oct 2022 16:52)  T(F): 97.6 (10 Oct 2022 05:00), Max: 98 (09 Oct 2022 16:52)  HR: 71 (10 Oct 2022 05:00) (64 - 84)  BP: 104/64 (10 Oct 2022 05:00) (104/64 - 126/75)  BP(mean): --  RR: 18 (10 Oct 2022 05:00) (18 - 19)  SpO2: 97% (10 Oct 2022 05:00) (95% - 98%)    Parameters below as of 10 Oct 2022 05:00  Patient On (Oxygen Delivery Method): room air        PHYSICAL EXAM:  GENERAL: Not in distress   CHEST/LUNG:  Not using accessory muscles   HEART: s1 and s2 present  ABDOMEN:  Incision sites has bandages in placed , with surrounding tenderness   EXTREMITIES: No pedal  edema  CNS: Awake and Alert        LABS:                        11.7   11.53 )-----------( 679      ( 09 Oct 2022 21:58 )             34.6       10-09    134<L>  |  99  |  12  ----------------------------<  95  4.9   |  28  |  0.9    Ca    10.1      09 Oct 2022 21:58  Phos  4.7     10-09  Mg     2.2     10-09    TPro  6.7  /  Alb  3.7  /  TBili  0.9  /  DBili  0.4<H>  /  AST  64<H>  /  ALT  153<H>  /  AlkPhos  139<H>  10-08    PT/INR - ( 09 Oct 2022 21:58 )   PT: 12.30 sec;   INR: 1.08 ratio      PTT - ( 08 Oct 2022 17:17 )  PTT:34.5 sec        MEDICATIONS  (STANDING):  acetaminophen     Tablet .. 650 milliGRAM(s) Oral every 6 hours  enoxaparin Injectable 40 milliGRAM(s) SubCutaneous every 24 hours  piperacillin/tazobactam IVPB.. 3.375 Gram(s) IV Intermittent every 8 hours    MEDICATIONS  (PRN):  ketorolac   Injectable 15 milliGRAM(s) IV Push every 8 hours PRN Moderate Pain (4 - 6)        RADIOLOGY & ADDITIONAL TESTS:    < from: CT Abdomen and Pelvis w/ IV Cont (10.08.22 @ 18:17) >  No pulmonary emboli.    New pelvic rim-enhancing fluid collection anterior to the rectum, measuring approximately 5.1 x 4.0 x 3.3 cm, consistent with abscess formation.         MICROBIOLOGY  DATA:   COVID-19 PCR . (10.08.22 @ 18:00)   COVID-19 PCR: NotDetec:

## 2022-10-10 NOTE — CONSULT NOTE ADULT - ASSESSMENT
Patient is a 52y old  Male with no significant PMH, recent hx of perforated appendicitis with feculent peritonitis s/p laparoscopic appendectomy on 09/28, now presents to the ER for evaluation of Right subdiaphragmatic pain. His postoperative course was complicated by LFT elevation and was seen by GI, attributed to Gilbert's syndrome, and was discharged home on PO Levaquin/Flagyl on 10/3. He states his pain began about 3 days ago and has been worsening and now experiences severe sharp pain when taking deep breaths. On admission, he found to have no fever but Leukocytosis. The CT abd/pelvis shows New pelvic rim-enhancing fluid collection anterior to the rectum, measuring approximately 5.1 x 4.0 x 3.3 cm, consistent with abscess formation. He has started on Zosyn and the ID consult requested to assist with further evaluation and antibiotic management.      # Pelvic Abscess - As per IR no window to drain  ( No documentation noted in the chart)   # S/p Perforated appendicitis with feculent peritonitis - s/p Laparoscopic Appendectomy on 9/28  # Leukocytosis- resolving     would recommend:    1. Continue Zosyn for now  2. Repeat CT abd/pelvis in few days to reassess the resolution of the abscess  3. It would be difficult to clear the infection with antibiotic Alone  4. Duration of ABx based on Radiological resolution of the abscess  5. Pain  management as needed    d/w Patient and Surgery team    will follow the patient with you and make further recommendation based on the clinical course and Lab results  Thank you for the opportunity to participate in Mr. ESCOBAR's care    Attending Attestation:    Spent more than 65 minutes on total encounter, more than 50 % of the visit was spent counseling and/or coordinating care by the Attending physician.

## 2022-10-11 ENCOUNTER — TRANSCRIPTION ENCOUNTER (OUTPATIENT)
Age: 52
End: 2022-10-11

## 2022-10-11 VITALS
DIASTOLIC BLOOD PRESSURE: 62 MMHG | TEMPERATURE: 96 F | HEART RATE: 971 BPM | RESPIRATION RATE: 18 BRPM | OXYGEN SATURATION: 98 % | SYSTOLIC BLOOD PRESSURE: 128 MMHG

## 2022-10-11 LAB
ANION GAP SERPL CALC-SCNC: 10 MMOL/L — SIGNIFICANT CHANGE UP (ref 7–14)
BASOPHILS # BLD AUTO: 0.07 K/UL — SIGNIFICANT CHANGE UP (ref 0–0.2)
BASOPHILS NFR BLD AUTO: 0.7 % — SIGNIFICANT CHANGE UP (ref 0–1)
BUN SERPL-MCNC: 16 MG/DL — SIGNIFICANT CHANGE UP (ref 10–20)
CALCIUM SERPL-MCNC: 9.7 MG/DL — SIGNIFICANT CHANGE UP (ref 8.4–10.4)
CHLORIDE SERPL-SCNC: 100 MMOL/L — SIGNIFICANT CHANGE UP (ref 98–110)
CO2 SERPL-SCNC: 26 MMOL/L — SIGNIFICANT CHANGE UP (ref 17–32)
CREAT SERPL-MCNC: 1 MG/DL — SIGNIFICANT CHANGE UP (ref 0.7–1.5)
EGFR: 90 ML/MIN/1.73M2 — SIGNIFICANT CHANGE UP
EOSINOPHIL # BLD AUTO: 0.24 K/UL — SIGNIFICANT CHANGE UP (ref 0–0.7)
EOSINOPHIL NFR BLD AUTO: 2.2 % — SIGNIFICANT CHANGE UP (ref 0–8)
GLUCOSE SERPL-MCNC: 111 MG/DL — HIGH (ref 70–99)
HCT VFR BLD CALC: 33.5 % — LOW (ref 42–52)
HGB BLD-MCNC: 11.2 G/DL — LOW (ref 14–18)
IMM GRANULOCYTES NFR BLD AUTO: 1 % — HIGH (ref 0.1–0.3)
LYMPHOCYTES # BLD AUTO: 2.32 K/UL — SIGNIFICANT CHANGE UP (ref 1.2–3.4)
LYMPHOCYTES # BLD AUTO: 21.7 % — SIGNIFICANT CHANGE UP (ref 20.5–51.1)
MAGNESIUM SERPL-MCNC: 2.1 MG/DL — SIGNIFICANT CHANGE UP (ref 1.8–2.4)
MCHC RBC-ENTMCNC: 31 PG — SIGNIFICANT CHANGE UP (ref 27–31)
MCHC RBC-ENTMCNC: 33.4 G/DL — SIGNIFICANT CHANGE UP (ref 32–37)
MCV RBC AUTO: 92.8 FL — SIGNIFICANT CHANGE UP (ref 80–94)
MONOCYTES # BLD AUTO: 1.5 K/UL — HIGH (ref 0.1–0.6)
MONOCYTES NFR BLD AUTO: 14 % — HIGH (ref 1.7–9.3)
NEUTROPHILS # BLD AUTO: 6.46 K/UL — SIGNIFICANT CHANGE UP (ref 1.4–6.5)
NEUTROPHILS NFR BLD AUTO: 60.4 % — SIGNIFICANT CHANGE UP (ref 42.2–75.2)
NRBC # BLD: 0 /100 WBCS — SIGNIFICANT CHANGE UP (ref 0–0)
PHOSPHATE SERPL-MCNC: 4.4 MG/DL — SIGNIFICANT CHANGE UP (ref 2.1–4.9)
PLATELET # BLD AUTO: 713 K/UL — HIGH (ref 130–400)
POTASSIUM SERPL-MCNC: 4.9 MMOL/L — SIGNIFICANT CHANGE UP (ref 3.5–5)
POTASSIUM SERPL-SCNC: 4.9 MMOL/L — SIGNIFICANT CHANGE UP (ref 3.5–5)
RBC # BLD: 3.61 M/UL — LOW (ref 4.7–6.1)
RBC # FLD: 14 % — SIGNIFICANT CHANGE UP (ref 11.5–14.5)
SODIUM SERPL-SCNC: 136 MMOL/L — SIGNIFICANT CHANGE UP (ref 135–146)
WBC # BLD: 10.7 K/UL — SIGNIFICANT CHANGE UP (ref 4.8–10.8)
WBC # FLD AUTO: 10.7 K/UL — SIGNIFICANT CHANGE UP (ref 4.8–10.8)

## 2022-10-11 PROCEDURE — 99024 POSTOP FOLLOW-UP VISIT: CPT

## 2022-10-11 RX ORDER — METRONIDAZOLE 500 MG
1 TABLET ORAL
Qty: 42 | Refills: 0
Start: 2022-10-11 | End: 2022-10-24

## 2022-10-11 RX ADMIN — PIPERACILLIN AND TAZOBACTAM 25 GRAM(S): 4; .5 INJECTION, POWDER, LYOPHILIZED, FOR SOLUTION INTRAVENOUS at 09:57

## 2022-10-11 RX ADMIN — PIPERACILLIN AND TAZOBACTAM 25 GRAM(S): 4; .5 INJECTION, POWDER, LYOPHILIZED, FOR SOLUTION INTRAVENOUS at 00:50

## 2022-10-11 NOTE — DISCHARGE NOTE NURSING/CASE MANAGEMENT/SOCIAL WORK - NSDCPEFALRISK_GEN_ALL_CORE
For information on Fall & Injury Prevention, visit: [email or web address] OR  [email or web address] OR  https://www.cdc.gov/steadi/patient.html

## 2022-10-11 NOTE — DISCHARGE NOTE PROVIDER - NSDCCPCAREPLAN_GEN_ALL_CORE_FT
PRINCIPAL DISCHARGE DIAGNOSIS  Diagnosis: Abscess  Assessment and Plan of Treatment: Follow Up with Dr. Mcdonald in 2 weeks. Please call office for confirmation of your appointment.  Diet: No change.  Pain: You can take over the counter medications such as Tylenol, and Ibuprofen for pain control. Please adhere to the instructions on the back of the bottle.  Antibiotics: A script for 14 more days of Levaquin and Flagyl were sent to your pharmacy. Please take as instructed.  If you develop fevers, chills, worsening pain, increased drainage from the wound, foul smelling drainage from the wound, nausea that won't subside, vomiting, or any other symptoms of concern, please call MD for further advice, evaluation, and/or treatment.  Activity: Ambulate and get out of bed as tolerated, and with assistance if feeling weak. Ambulation is highly encouraged.

## 2022-10-11 NOTE — DISCHARGE NOTE PROVIDER - HOSPITAL COURSE
52y M with no significant PMH, recent hx of perforated appendicitis with feculent peritonitis s/p laparoscopic appendectomy on 09/28 presents with right subdiaphragmatic pain. His postoperative course was complicated by LFT elevation and was seen by GI, attributed to Gilbert's syndrome, and was discharged home on PO Levaquin/Flagyl on 10/3. He states his pain began about 3 days ago and has been worsening and now experiences severe sharp pain when taking deep breaths. Denies fevers/chills, or PO intolerance. Denies Nausea/vomiting/diarrhea.     Since being in the hospital his pain and greatly subsided. Imaging revealed a new pelvic rim-enhancing fluid collection anterior to the rectum measuring approximately 5.1 x 4.0 x 3.3 consistent with abscess formation. IR was consulted and determined there was no safe percutaneous window for drainage. ID was also consulted and after a discussion with the attending, it was determined that the patient's clinical course is safe for discharge. He will be discharged with 14 more days of Levaquin and Flagyl. He will follow up with Dr. Mcdonald in two weeks.

## 2022-10-11 NOTE — DISCHARGE NOTE NURSING/CASE MANAGEMENT/SOCIAL WORK - PATIENT PORTAL LINK FT
You can access the FollowMyHealth Patient Portal offered by [facility] by registering at the following website: [email or web address]. By joining [employer]’s FollowMyHealth portal, you will also be able to view your health information using other applications (apps) compatible with our system.

## 2022-10-11 NOTE — DISCHARGE NOTE PROVIDER - CARE PROVIDER_API CALL
Jacquelyn Mcdonald)  Surgery; Surgical Critical Care  [address] [facility], [address] [place] [id number]  Phone: [phone]  Fax: [phone]  Follow Up Time: 2 weeks

## 2022-10-11 NOTE — DISCHARGE NOTE PROVIDER - NSDCFUSCHEDAPPT_GEN_ALL_CORE_FT
Jacquelyn Mcdonald  [facility] Physician [facility]  GENSURG [address]  Scheduled Appointment: 10/24/2022    Rut Pfeiffer  [facility]  NEUROLOGY [address]  Scheduled Appointment: 11/28/2022     Never

## 2022-10-11 NOTE — PROGRESS NOTE ADULT - ATTENDING COMMENTS
Clinically stable, afebrile.  Tolerates regular diet    Abdomen: soft, nontender    Assessment/Plan:  - regular diet  - ID eval for outpt antibiotics  -  for discharge planning
IR eval - discussed and no window is available for drainage  Continue iv Zosyn  Regular diet  ID eval   DVT prophylaxis
Patient is clinically stable, afebrile.  Tolerates po diet and has regular bowel, movements.    Abdomen: soft, nontender    WBC 10    Assessment/Plan:  Discussed with Dr. Kong further AB treatment.  He agreed with me that there is no need of new CT at this time as it would be too soon.  He recommends Levaquin and Flagyl po for 14 days and follow up as outpatient.

## 2022-10-11 NOTE — PROGRESS NOTE ADULT - ASSESSMENT
[id number] with hx of perforated appendicitis with feculent peritonitis, presenting with pelvic abscess    Plan:  - Pain Control: Multimodal with Tylenol 650mg q6hrs/Ibuprofen  - IS, ambulation, OOB  - Monitor Vitals/labs - replete lytes as necessary  -C/w piperacillin/tazobactam IVPB  - IR - no drainable collection  - ID -  Continue Zosyn for now | Repeat CT abd/pelvis in few days to reassess the resolution of the abscess | difficult to clear the infection with antibiotic Alone        - Duration of ABx based on Radiological resolution of the abscess    Trauma Surgery [id number]  [id number] with hx of perforated appendicitis with feculent peritonitis, presenting with pelvic abscess    Plan:  - Pain Control: Multimodal with Tylenol 650mg q6hrs/Ibuprofen  - IS, ambulation, OOB  - Monitor Vitals/labs - replete lytes as necessary  -C/w piperacillin/tazobactam IVPB  - IR - no drainable collection      Trauma Surgery [id number]

## 2022-10-11 NOTE — PROGRESS NOTE ADULT - SUBJECTIVE AND OBJECTIVE BOX
INTERVENTIONAL RADIOLOGY CONSULT:     Procedure Requested:     HPI:   52y M with no significant PMH, recent hx of perforated appendicitis with feculent peritonitis s/p laparoscopic appendectomy on 09/28 presents with Right sub-diaphragmatic pain. His post-operative course was complicated by LFT elevation and was seen by GI, attributed to Gilbert's syndrome and was discharged home on PO Levaquin/Flagyl on 10/3. He states his pain began about 3 days ago and has been worsening and now experiences severe sharp pain when taking deep breaths. Denies fevers/chills, or PO intolerance. Denies Nausea/vomiting/diarrhea.  (08 Oct 2022 23:02)      PAST MEDICAL & SURGICAL HISTORY:  S/P laparoscopic appendectomy      MEDICATIONS  (STANDING):  acetaminophen     Tablet .. 650 milliGRAM(s) Oral every 6 hours  enoxaparin Injectable 40 milliGRAM(s) SubCutaneous every 24 hours  piperacillin/tazobactam IVPB.- 3.375 Gram(s) IV Intermittent once  piperacillin/tazobactam IVPB.- 3.375 Gram(s) IV Intermittent once  piperacillin/tazobactam IVPB.. 3.375 Gram(s) IV Intermittent every 8 hours    MEDICATIONS  (PRN):  ketorolac   Injectable 15 milliGRAM(s) IV Push every 8 hours PRN Moderate Pain (4 - 6)      Allergies:  No Known Allergies    Intolerances      Social History:   Smoking: Yes [ ]  No [ ]   ______pk yrs  ETOH  Yes [ ]  No [ ]  Social [ ]  DRUGS:  Yes [ ]  No [ ]  if so what______________    FAMILY HISTORY:      Physical Exam:   Vital Signs Last 24 Hrs  T(C): 36.6 (09 Oct 2022 08:15), Max: 36.8 (08 Oct 2022 14:39)  T(F): 97.9 (09 Oct 2022 08:15), Max: 98.2 (08 Oct 2022 14:39)  HR: 79 (09 Oct 2022 08:15) (72 - 83)  BP: 104/57 (09 Oct 2022 08:15) (104/57 - 131/62)  BP(mean): 92 (08 Oct 2022 15:05) (92 - 92)  RR: 18 (09 Oct 2022 08:15) (16 - 18)  SpO2: 96% (09 Oct 2022 08:15) (96% - 99%)    Parameters below as of 09 Oct 2022 08:15  Patient On (Oxygen Delivery Method): room air      General:     Lungs:    Cardiovascular:     Abdomen:    Extremities:    Musculoskeletal:    Neuro/Psych:        Labs:                         12.3   16.77 )-----------( 790      ( 08 Oct 2022 16:00 )             36.6     10-08    137  |  101  |  9<L>  ----------------------------<  94  5.3<H>   |  28  |  0.8    Ca    9.7      08 Oct 2022 16:00  Mg     2.2     10-08    TPro  6.7  /  Alb  3.7  /  TBili  0.9  /  DBili  0.4<H>  /  AST  64<H>  /  ALT  153<H>  /  AlkPhos  139<H>  10-08    PT/INR - ( 08 Oct 2022 17:17 )   PT: 11.50 sec;   INR: 1.01 ratio    PTT - ( 08 Oct 2022 17:17 )  PTT:34.5 sec    Pertinent labs:                      12.3   16.77 )-----------( 790      ( 08 Oct 2022 16:00 )             36.6       10-08    137  |  101  |  9<L>  ----------------------------<  94  5.3<H>   |  28  |  0.8    Ca    9.7      08 Oct 2022 16:00  Mg     2.2     10-08    TPro  6.7  /  Alb  3.7  /  TBili  0.9  /  DBili  0.4<H>  /  AST  64<H>  /  ALT  153<H>  /  AlkPhos  139<H>  10-08      PT/INR - ( 08 Oct 2022 17:17 )   PT: 11.50 sec;   INR: 1.01 ratio    PTT - ( 08 Oct 2022 17:17 )  PTT:34.5 sec    Radiology & Additional Studies:  CT Abdomen and Pelvis (10/8/2022 and 9/27/2022)    Radiology imaging reviewed:     IMPRESSION:   -No pulmonary emboli.   -New pelvic rim-enhancing fluid collection anterior to the rectum, measuring approximately 5.1 x 4.0 x 3.3 cm, consistent with abscess formation. A call back request was submitted     --- End of Report ---      ASSESSMENT/ PLAN:  51 yo male s/p laparoscopic appendectomy for perforated appendicitis and feculent peritonitis on 9/28/2022, who presents with 3 day h/o right sub-diaphragmatic pain and leukocytosis.  CT scan performed one day ago shows a new midline, deep pelvic collection measuring 5.1 x 4.0 x 3.3 cm.  Primary team requesting percutaneous drainage of this collection, suspicious for abscess.  No safe percutaneous window is present, due to intervening neurovascular anatomy.  Collection is also borderline in size for what would generally be considered for pigtail catheter placement.  Recommend continue with antibiotics for now.  Should clinical condition remain unchanged or deteriorate in the next 48-72 hours,, follow-up CT scan would be recommended.  If collection increases in size, a safe window for percutaneous access might become available.    Case d/w Dr. Mcdonald via telephone at 9:40 am, with read-back.    Thank you for the courtesy of this consult, please call [phone] with any further questions.   
GENERAL SURGERY PROGRESS NOTE    Patient: SE ESCOBAR , 52y ([birth year])Male   MRN: [id number]  Location: [address]  Visit: 10-08-22 Inpatient  Date: 10-10-22 @ 03:24    Hospital Day #: 3  Post-Op Day #: 12    Procedure/Dx/Injuries: Pelvic abscess    Events of past 24 hours: Patient says his pain is doing much better, that it is subdued but still hurts to breathe deep. Denies nausea, vomiting, and has not ambulated since entering the hospital yet. Endorses voiding, flatus, and bowel movements.     PAST MEDICAL & SURGICAL HISTORY:  S/P laparoscopic appendectomy          Vitals:   T(F): 97.1 (10-10-22 @ 01:00), Max: 98 (10-09-22 @ 16:52)  HR: 64 (10-10-22 @ 01:00)  BP: 121/70 (10-10-22 @ 01:00)  RR: 18 (10-10-22 @ 01:00)  SpO2: 95% (10-10-22 @ 01:00)      Diet, Regular      Fluids:     I & O's:    Bowel Movement: : [x] YES [] NO  Flatus: : [x] YES [] NO    PHYSICAL EXAM:  General: NAD, AAOx3, calm and cooperative  HEENT: NCAT, ISABELLA, EOMI, Trachea ML, Neck supple  Cardiac: S1, S2  Respiratory: Normal respiratory effort  Abdomen: Soft, non-distended, non-tender, no rebound, no guarding.   Skin: Warm/dry, normal color, no jaundice  Incision/wound: Dressings in place, clean, dry and intact    MEDICATIONS  (STANDING):  acetaminophen     Tablet .. 650 milliGRAM(s) Oral every 6 hours  enoxaparin Injectable 40 milliGRAM(s) SubCutaneous every 24 hours  piperacillin/tazobactam IVPB.. 3.375 Gram(s) IV Intermittent every 8 hours    MEDICATIONS  (PRN):  ketorolac   Injectable 15 milliGRAM(s) IV Push every 8 hours PRN Moderate Pain (4 - 6)      DVT PROPHYLAXIS: enoxaparin Injectable 40 milliGRAM(s) SubCutaneous every 24 hours    GI PROPHYLAXIS:   ANTICOAGULATION:   ANTIBIOTICS:  piperacillin/tazobactam IVPB.. 3.375 Gram(s)    LAB/STUDIES:  Labs:  CAPILLARY BLOOD GLUCOSE                              11.7   11.53 )-----------( 679      ( 09 Oct 2022 21:58 )             34.6       Auto Neutrophil %: 66.3 % (10-09-22 @ 21:58)  Auto Immature Granulocyte %: 1.4 % (10-09-22 @ 21:58)    10-09    134<L>  |  99  |  12  ----------------------------<  95  4.9   |  28  |  0.9      Calcium, Total Serum: 10.1 mg/dL (10-09-22 @ 21:58)      LFTs:             6.7  | 0.9  | 64       ------------------[139     ( 08 Oct 2022 16:00 )  3.7  | 0.4  | 153         Lipase:64     Amylase:x             Coags:     12.30  ----< 1.08    ( 09 Oct 2022 21:58 )     x           CARDIAC MARKERS ( 08 Oct 2022 16:00 )  x     / <0.01 ng/mL / x     / x     / x          Serum Pro-Brain Natriuretic Peptide: 67 pg/mL (10-08-22 @ 16:00)    IMAGING:  < from: CT Abdomen and Pelvis w/ IV Cont (10.08.22 @ 18:17) >  IMPRESSION:    No pulmonary emboli.    New pelvic rim-enhancing fluid collection anterior to the rectum,   measuring approximately 5.1 x 4.0 x 3.3 cm, consistent with abscess   formation. A call back request was submitted    Preliminary findings were communicated to Honorio Raya on 10/8/2022 at   8:18 PM.    --- End of Report ---    < end of copied text >    · Assessment	  52yMale with hx of perforated appendicitis with feculent peritonitis, presenting with pelvic abscess    Plan:  - Pain Control: Multimodal with Tylenol 650mg q6hrs/Ibuprofen  - IS, ambulation, OOB  - Monitor Strict Intake/Output [id number], Replete Electrolytes PRN  -C/w piperacillin/tazobactam IVPB  - F/U IR consult for possible drainage  - ID consult    [id number] 
TRAUMA SURGERY PROGRESS NOTE    Patient: SE ESCOBAR , 52y ([birth year])Male   MRN: [id number]  Location: [facility] ER Hold 0[address] A  Visit: 10-08-22 Inpatient  Date: 10-09-22 @ 10:10    Procedure/Dx/Injuries: pelvic abscess; s/p lap appy 9/28    Events of past 24 hours:  Recent hx of perforated appendicitis with feculent peritonitis s/p laparoscopic appendectomy on 09/28 presented to the ED with right subdiaphragmatic pain. His postoperative course was complicated by LFT elevation and was seen by GI, attributed to Gilbert's syndrome, and was discharged home on PO Levaquin/Flagyl on 10/3. He stated his pain began about 3 days ago and has been worsening and now experiences severe sharp pain when taking deep breaths. Denies fevers/chills, or PO intolerance. Denies Nausea/vomiting/diarrhea. Admitted to the surgery service; no acute events overnight.     PAST MEDICAL & SURGICAL HISTORY:  S/P laparoscopic appendectomy          Vitals:   T(F): 97.9 (10-09-22 @ 08:15), Max: 98.2 (10-08-22 @ 14:39)  HR: 79 (10-09-22 @ 08:15)  BP: 104/57 (10-09-22 @ 08:15)  RR: 18 (10-09-22 @ 08:15)  SpO2: 96% (10-09-22 @ 08:15)      Diet, NPO      Fluids:     I & O's:    PHYSICAL EXAM:  General: Awake, alert, Oriented to person/place/time. No acute distress  Neuro: No focal deficits  HEENT: EOMI, No scleral icterus. Trachea midline  Lung: Non-labored on room air, no wheezes/rhonchi  Cardiovascular: RRR   Abdomen: Soft, non-tender, non-distended. No masses or organomegaly. + Tenderness at left costal margin no rebound or guarding.   Extremities: no clubbing/cyanosis/edema  Skin: warm and well perfused. Capillary refill < 2 seconds    MEDICATIONS  (STANDING):  acetaminophen     Tablet .. 650 milliGRAM(s) Oral every 6 hours  enoxaparin Injectable 40 milliGRAM(s) SubCutaneous every 24 hours  piperacillin/tazobactam IVPB.- 3.375 Gram(s) IV Intermittent once  piperacillin/tazobactam IVPB.- 3.375 Gram(s) IV Intermittent once  piperacillin/tazobactam IVPB.. 3.375 Gram(s) IV Intermittent every 8 hours    MEDICATIONS  (PRN):  ketorolac   Injectable 15 milliGRAM(s) IV Push every 8 hours PRN Moderate Pain (4 - 6)      DVT PROPHYLAXIS: SCDs, enoxaparin Injectable 40 milliGRAM(s) SubCutaneous every 24 hours    GI PROPHYLAXIS:   ANTICOAGULATION:   ANTIBIOTICS: piperacillin/tazobactam IVPB.- 3.375 Gram(s)  piperacillin/tazobactam IVPB.- 3.375 Gram(s)  piperacillin/tazobactam IVPB.. 3.375 Gram(s)            LAB/STUDIES:  Labs:  CAPILLARY BLOOD GLUCOSE                              12.3   16.77 )-----------( 790      ( 08 Oct 2022 16:00 )             36.6       Auto Neutrophil %: 78.6 % (10-08-22 @ 16:00)  Auto Immature Granulocyte %: 1.3 % (10-08-22 @ 16:00)    10-08    137  |  101  |  9<L>  ----------------------------<  94  5.3<H>   |  28  |  0.8      Calcium, Total Serum: 9.7 mg/dL (10-08-22 @ 16:00)      LFTs:             6.7  | 0.9  | 64       ------------------[139     ( 08 Oct 2022 16:00 )  3.7  | 0.4  | 153         Lipase:64     Amylase:x             Coags:     11.50  ----< 1.01    ( 08 Oct 2022 17:17 )     34.5        CARDIAC MARKERS ( 08 Oct 2022 16:00 )  x     / <0.01 ng/mL / x     / x     / x          Serum Pro-Brain Natriuretic Peptide: 67 pg/mL (10-08-22 @ 16:00)      IMAGING:  < from: CT Abdomen and Pelvis w/ IV Cont (10.08.22 @ 18:17) >  No pulmonary emboli.    New pelvic rim-enhancing fluid collection anterior to the rectum,   measuring approximately 5.1 x 4.0 x 3.3 cm, consistent with abscess   formation. A call back request was submitted    < end of copied text >      
GENERAL SURGERY PROGRESS NOTE    Patient: SE ESCOBAR , 52y ([birth year])Male   MRN: [id number]  Location: [address]  Visit: 10-08-22 Inpatient  Date: 10-11-22 @ 02:42    Hospital Day #: 4  Post-Op Day #: 13    Events of past 24 hours:  GONZÁLEZ    Patient seen and examined at the bedside.  Patient states he is comfortable.  Patient states he is tolerating his diet w/o nausea or vomiting.  Patient states he is passing flatus and BM.  Patient states he is ambulating and voiding.    PAST MEDICAL & SURGICAL HISTORY:  S/P laparoscopic appendectomy      Vitals:   T(F): 97.4 (10-11-22 @ 01:00), Max: 98.5 (10-10-22 @ 16:55)  HR: 72 (10-11-22 @ 01:00)  BP: 111/68 (10-11-22 @ 01:00)  RR: 18 (10-11-22 @ 01:00)  SpO2: 98% (10-11-22 @ 01:00)      Diet, Regular    PHYSICAL EXAM:  General: NAD, calm and cooperative.  Cardiac: RRR.  Respiratory: On RA. Speaks in complete sentences. No accessory muscles of respiration in use. Bilateral chest rise.  Abdomen: Soft, non-distended, non-tender, no rebound, no guarding.   Neuro: Moves all extremities.  Skin: Warm/dry, normal color, no jaundice.      MEDICATIONS  (STANDING):  acetaminophen     Tablet .. 650 milliGRAM(s) Oral every 6 hours  enoxaparin Injectable 40 milliGRAM(s) SubCutaneous every 24 hours  piperacillin/tazobactam IVPB.. 3.375 Gram(s) IV Intermittent every 8 hours    MEDICATIONS  (PRN):  ketorolac   Injectable 15 milliGRAM(s) IV Push every 8 hours PRN Moderate Pain (4 - 6)      DVT PROPHYLAXIS: enoxaparin Injectable 40 milliGRAM(s) SubCutaneous every 24 hours    GI PROPHYLAXIS:   ANTICOAGULATION:   ANTIBIOTICS:  piperacillin/tazobactam IVPB.. 3.375 Gram(s)      LAB/STUDIES:  Labs:  CAPILLARY BLOOD GLUCOSE                          11.7   11.53 )-----------( 679      ( 09 Oct 2022 21:58 )             34.6         10-09    134<L>  |  99  |  12  ----------------------------<  95  4.9   |  28  |  0.9      LFTs:      Coags:     12.30  ----< 1.08    ( 09 Oct 2022 21:58 )     x           Serum Pro-Brain Natriuretic Peptide: 67 pg/mL (10-08-22 @ 16:00)      IMAGING:  No new imaging.    ACCESS/ DEVICES:  [ ] Peripheral IV

## 2022-10-16 LAB
CULTURE RESULTS: SIGNIFICANT CHANGE UP
CULTURE RESULTS: SIGNIFICANT CHANGE UP
SPECIMEN SOURCE: SIGNIFICANT CHANGE UP
SPECIMEN SOURCE: SIGNIFICANT CHANGE UP

## 2022-10-17 NOTE — CDI QUERY NOTE - NSCDIOTHERTXTBX_GEN_ALL_CORE_HH
CDI CLARIFICATION  Documented:    52y M with no significant PMH, recent hx of perforated appendicitis with feculent peritonitis s/p laparoscopic appendectomy on 09/28 presents with Right subdiaphragmatic pain. His postoperative course was complicated by LFT elevation and was seen by GI, attributed to Gilbert's syndrome, and was discharged home on PO Levaquin/Flagyl on 10/3. He states his pain began about 3 days ago and has been worsening and now experiences severe sharp pain when taking deep breaths.    ED · Clinical Summary  (MDM): Summarize the clinical encounter               Post-operative intraabdominal abscess.  Will admit to surgery for IV antibiotics and further management.    Attending mult PN: 52yMale with hx of perforated appendicitis with feculent peritonitis, presenting with pelvic abscess.    from: CT Abdomen and Pelvis w/ IV Cont (10.08.22 @ 18:17) >  IMPRESSION:  New pelvic rim-enhancing fluid collection anterior to the rectum, measuring approximately 5.1 x 4.0 x 3.3 cm, consistent with abscess formation.    MEDICATIONS  (STANDING):  acetaminophen     Tablet .. 650 milliGRAM(s) Oral every 6 hours  enoxaparin Injectable 40 milliGRAM(s) SubCutaneous every 24 hours  piperacillin/tazobactam IVPB.- 3.375 Gram(s) IV Intermittent once  piperacillin/tazobactam IVPB.- 3.375 Gram(s) IV Intermittent once  piperacillin/tazobactam IVPB.. 3.375 Gram(s) IV Intermittent every 8 hours    QUERY  Based on your professional judgement and the clinical indicators, please clarify if the Post-operative intraabdominal abscess can be further specified as:  •  Post-operative intraabdominal abscess was treated and resolved at the time of discharge.  • Other (please specify):  • Clinically unable to determine Post-operative intraabdominal abscess.      Thank you!

## 2022-10-18 ENCOUNTER — EMERGENCY (EMERGENCY)
Facility: HOSPITAL | Age: 52
LOS: 0 days | Discharge: HOME | End: 2022-10-18
Attending: EMERGENCY MEDICINE | Admitting: EMERGENCY MEDICINE

## 2022-10-18 VITALS
HEART RATE: 77 BPM | RESPIRATION RATE: 18 BRPM | WEIGHT: 169.98 LBS | HEIGHT: 69 IN | DIASTOLIC BLOOD PRESSURE: 76 MMHG | SYSTOLIC BLOOD PRESSURE: 137 MMHG | TEMPERATURE: 98 F | OXYGEN SATURATION: 99 %

## 2022-10-18 DIAGNOSIS — Z90.49 ACQUIRED ABSENCE OF OTHER SPECIFIED PARTS OF DIGESTIVE TRACT: ICD-10-CM

## 2022-10-18 DIAGNOSIS — R10.31 RIGHT LOWER QUADRANT PAIN: ICD-10-CM

## 2022-10-18 DIAGNOSIS — Z98.890 OTHER SPECIFIED POSTPROCEDURAL STATES: ICD-10-CM

## 2022-10-18 DIAGNOSIS — K65.1 PERITONEAL ABSCESS: ICD-10-CM

## 2022-10-18 DIAGNOSIS — Z90.49 ACQUIRED ABSENCE OF OTHER SPECIFIED PARTS OF DIGESTIVE TRACT: Chronic | ICD-10-CM

## 2022-10-18 DIAGNOSIS — Z87.891 PERSONAL HISTORY OF NICOTINE DEPENDENCE: ICD-10-CM

## 2022-10-18 DIAGNOSIS — Z87.19 PERSONAL HISTORY OF OTHER DISEASES OF THE DIGESTIVE SYSTEM: ICD-10-CM

## 2022-10-18 LAB
ALBUMIN SERPL ELPH-MCNC: 4 G/DL — SIGNIFICANT CHANGE UP (ref 3.5–5.2)
ALP SERPL-CCNC: 70 U/L — SIGNIFICANT CHANGE UP (ref 30–115)
ALT FLD-CCNC: 47 U/L — HIGH (ref 0–41)
ANION GAP SERPL CALC-SCNC: 6 MMOL/L — LOW (ref 7–14)
AST SERPL-CCNC: 68 U/L — HIGH (ref 0–41)
BASOPHILS # BLD AUTO: 0.06 K/UL — SIGNIFICANT CHANGE UP (ref 0–0.2)
BASOPHILS NFR BLD AUTO: 0.8 % — SIGNIFICANT CHANGE UP (ref 0–1)
BILIRUB SERPL-MCNC: 0.8 MG/DL — SIGNIFICANT CHANGE UP (ref 0.2–1.2)
BUN SERPL-MCNC: 9 MG/DL — LOW (ref 10–20)
CALCIUM SERPL-MCNC: 9.5 MG/DL — SIGNIFICANT CHANGE UP (ref 8.4–10.5)
CHLORIDE SERPL-SCNC: 101 MMOL/L — SIGNIFICANT CHANGE UP (ref 98–110)
CO2 SERPL-SCNC: 26 MMOL/L — SIGNIFICANT CHANGE UP (ref 17–32)
CREAT SERPL-MCNC: 0.8 MG/DL — SIGNIFICANT CHANGE UP (ref 0.7–1.5)
EGFR: 106 ML/MIN/1.73M2 — SIGNIFICANT CHANGE UP
EOSINOPHIL # BLD AUTO: 0.17 K/UL — SIGNIFICANT CHANGE UP (ref 0–0.7)
EOSINOPHIL NFR BLD AUTO: 2.3 % — SIGNIFICANT CHANGE UP (ref 0–8)
GLUCOSE SERPL-MCNC: 97 MG/DL — SIGNIFICANT CHANGE UP (ref 70–99)
HCT VFR BLD CALC: 39.4 % — LOW (ref 42–52)
HGB BLD-MCNC: 13.2 G/DL — LOW (ref 14–18)
IMM GRANULOCYTES NFR BLD AUTO: 0.4 % — HIGH (ref 0.1–0.3)
LACTATE SERPL-SCNC: 0.9 MMOL/L — SIGNIFICANT CHANGE UP (ref 0.7–2)
LIDOCAIN IGE QN: 54 U/L — SIGNIFICANT CHANGE UP (ref 7–60)
LYMPHOCYTES # BLD AUTO: 2.2 K/UL — SIGNIFICANT CHANGE UP (ref 1.2–3.4)
LYMPHOCYTES # BLD AUTO: 29.5 % — SIGNIFICANT CHANGE UP (ref 20.5–51.1)
MCHC RBC-ENTMCNC: 31.4 PG — HIGH (ref 27–31)
MCHC RBC-ENTMCNC: 33.5 G/DL — SIGNIFICANT CHANGE UP (ref 32–37)
MCV RBC AUTO: 93.8 FL — SIGNIFICANT CHANGE UP (ref 80–94)
MONOCYTES # BLD AUTO: 0.81 K/UL — HIGH (ref 0.1–0.6)
MONOCYTES NFR BLD AUTO: 10.9 % — HIGH (ref 1.7–9.3)
NEUTROPHILS # BLD AUTO: 4.18 K/UL — SIGNIFICANT CHANGE UP (ref 1.4–6.5)
NEUTROPHILS NFR BLD AUTO: 56.1 % — SIGNIFICANT CHANGE UP (ref 42.2–75.2)
NRBC # BLD: 0 /100 WBCS — SIGNIFICANT CHANGE UP (ref 0–0)
PLATELET # BLD AUTO: 600 K/UL — HIGH (ref 130–400)
POTASSIUM SERPL-MCNC: SIGNIFICANT CHANGE UP MMOL/L (ref 3.5–5)
POTASSIUM SERPL-SCNC: SIGNIFICANT CHANGE UP MMOL/L (ref 3.5–5)
PROT SERPL-MCNC: 7.5 G/DL — SIGNIFICANT CHANGE UP (ref 6–8)
RBC # BLD: 4.2 M/UL — LOW (ref 4.7–6.1)
RBC # FLD: 13.9 % — SIGNIFICANT CHANGE UP (ref 11.5–14.5)
SODIUM SERPL-SCNC: 133 MMOL/L — LOW (ref 135–146)
WBC # BLD: 7.45 K/UL — SIGNIFICANT CHANGE UP (ref 4.8–10.8)
WBC # FLD AUTO: 7.45 K/UL — SIGNIFICANT CHANGE UP (ref 4.8–10.8)

## 2022-10-18 PROCEDURE — 99284 EMERGENCY DEPT VISIT MOD MDM: CPT | Mod: 24

## 2022-10-18 PROCEDURE — 99285 EMERGENCY DEPT VISIT HI MDM: CPT

## 2022-10-18 PROCEDURE — 74177 CT ABD & PELVIS W/CONTRAST: CPT | Mod: 26,MA

## 2022-10-18 RX ORDER — SODIUM CHLORIDE 9 MG/ML
1000 INJECTION, SOLUTION INTRAVENOUS ONCE
Refills: 0 | Status: COMPLETED | OUTPATIENT
Start: 2022-10-18 | End: 2022-10-18

## 2022-10-18 RX ADMIN — SODIUM CHLORIDE 1000 MILLILITER(S): 9 INJECTION, SOLUTION INTRAVENOUS at 09:01

## 2022-10-18 NOTE — CONSULT NOTE ADULT - ASSESSMENT
ASSESSMENT:  52yM w/ PSH of laparoscopic appendectomy for perforated appendicitis (9/28/22) c/b pelvic abscess presents to the ED with RLQ pain. Pain started 24hours ago, intermittent and not aggravated by any triggers. Denies fevers/chills, nausea/vomiting. Tolerating PO diet. Patient still taking Levaquin and Flagyl. Patient reports having BM daily. Physical exam findings, imaging, and labs as documented above.     PLAN:  -CT scan reviewed with attending, decrease size of pelvic abscess   -No acute surgical intervention   -Patient to continue taking Levaquin and Flagyl   -OTC pain medications prn  -Patient to follow up with Dr. Mcdonald as an outpatient - has appointment on Monday (10/24/22)  -Dispo per ED    Above plan discussed with Attending Surgeon Dr. Mcdonald, patient, patient family, and Primary team  10-18-22 @ 10:48    Trauma SPECTRA: [id number]

## 2022-10-18 NOTE — ED PROVIDER NOTE - PATIENT PORTAL LINK FT
You can access the FollowMyHealth Patient Portal offered by [facility] by registering at the following website: [email or web address]. By joining [employer]’s FollowMyHealth portal, you will also be able to view your health information using other applications (apps) compatible with our system.

## 2022-10-18 NOTE — CONSULT NOTE ADULT - SUBJECTIVE AND OBJECTIVE BOX
GENERAL SURGERY CONSULT NOTE    Patient: SE ESCOBAR , 52y ([birth year])Male   MRN: [id number]  Location: [facility] ED  Visit: 10-18-22 Emergency  Date: 10-18-22 @ 10:48    HPI:  Patient is a 52 year old Female with PSH of laparoscopic appendectomy for perforated appendicitis (9/28/22) c/b pelvic abscess presents to the ED with RLQ pain. Pain started 24hours ago, intermittent and not aggravated by any triggers. Denies fevers/chills, nausea/vomiting. Tolerating PO diet. Patient still taking Levaquin and Flagyl. Patient reports having BM daily. Denies CP, SOB, palpitations, weakness/numbness, dysuria.     PAST MEDICAL & SURGICAL HISTORY:  S/P laparoscopic appendectomy          Home Medications:    VITALS:  T(F): 97.6 (10-18-22 @ 06:19), Max: 97.6 (10-18-22 @ 06:19)  HR: 77 (10-18-22 @ 06:19) (77 - 77)  BP: 137/76 (10-18-22 @ 06:19) (137/76 - 137/76)  RR: 18 (10-18-22 @ 06:19) (18 - 18)  SpO2: 99% (10-18-22 @ 06:19) (99% - 99%)    PHYSICAL EXAM:  General: NAD, AAOx3, calm and cooperative  HEENT: NCAT, EOMI  Cardiac: S1, S2  Respiratory: normal respiratory effort, bilateral breath sounds   Abdomen: Soft, non-distended, mild RLQ tenderness to deep palpation, no rebound, no guarding  Skin: Warm/dry, normal color, no jaundice    MEDICATIONS  (STANDING):    MEDICATIONS  (PRN):    LAB/STUDIES:                        13.2   7.45  )-----------( 600      ( 18 Oct 2022 07:55 )             39.4     10-18    133<L>  |  101  |  9<L>  ----------------------------<  97  tnp   |  26  |  0.8    Ca    9.5      18 Oct 2022 07:55    TPro  7.5  /  Alb  4.0  /  TBili  0.8  /  DBili  x   /  AST  68<H>  /  ALT  47<H>  /  AlkPhos  70  10-18    LIVER FUNCTIONS - ( 18 Oct 2022 07:55 )  Alb: 4.0 g/dL / Pro: 7.5 g/dL / ALK PHOS: 70 U/L / ALT: 47 U/L / AST: 68 U/L / GGT: x           IMAGING:  < from: CT Abdomen and Pelvis w/ IV Cont (10.18.22 @ 08:00) >  IMPRESSION:  Since 10/8/2022:  1.  Slightly decreased size of a pelvic abscess just anterior to the   rectum measuring 3.7 x 2.5 x 3.3 cm (previously 4.6 x 3.4 x 3.5 cm when   measured in a similar fashion).  2.  Postsurgical changes from prior appendectomy. No definite new   collection identified.  3.  Otherwise stable exam.  --- End of Report ---   GENERAL SURGERY CONSULT NOTE    Patient: SE ESCOBAR , 52y ([birth year])Male   MRN: [id number]  Location: [facility] ED  Visit: 10-18-22 Emergency  Date: 10-18-22 @ 10:48    HPI:  Patient is a 52 year old male with PSH of laparoscopic appendectomy for perforated appendicitis and diffuse peritonitis (9/28/22) c/b pelvic abscess presents to the ED with RLQ pain. Pain started 24hours ago, intermittent and not aggravated by any triggers. Denies fevers/chills, nausea/vomiting. Tolerating PO diet. Patient still taking Levaquin and Flagyl. Patient reports having BM daily. Denies CP, SOB, palpitations, weakness/numbness, dysuria.     PAST MEDICAL & SURGICAL HISTORY:  S/P laparoscopic appendectomy          Home Medications:    VITALS:  T(F): 97.6 (10-18-22 @ 06:19), Max: 97.6 (10-18-22 @ 06:19)  HR: 77 (10-18-22 @ 06:19) (77 - 77)  BP: 137/76 (10-18-22 @ 06:19) (137/76 - 137/76)  RR: 18 (10-18-22 @ 06:19) (18 - 18)  SpO2: 99% (10-18-22 @ 06:19) (99% - 99%)    PHYSICAL EXAM:  General: NAD, AAOx3, calm and cooperative  HEENT: NCAT, EOMI  Cardiac: S1, S2  Respiratory: normal respiratory effort, bilateral breath sounds   Abdomen: Soft, non-distended, mild RLQ tenderness to deep palpation, no rebound, no guarding  Skin: Warm/dry, normal color, no jaundice    MEDICATIONS  (STANDING):    MEDICATIONS  (PRN):    LAB/STUDIES:                        13.2   7.45  )-----------( 600      ( 18 Oct 2022 07:55 )             39.4     10-18    133<L>  |  101  |  9<L>  ----------------------------<  97  tnp   |  26  |  0.8    Ca    9.5      18 Oct 2022 07:55    TPro  7.5  /  Alb  4.0  /  TBili  0.8  /  DBili  x   /  AST  68<H>  /  ALT  47<H>  /  AlkPhos  70  10-18    LIVER FUNCTIONS - ( 18 Oct 2022 07:55 )  Alb: 4.0 g/dL / Pro: 7.5 g/dL / ALK PHOS: 70 U/L / ALT: 47 U/L / AST: 68 U/L / GGT: x           IMAGING:  < from: CT Abdomen and Pelvis w/ IV Cont (10.18.22 @ 08:00) >  IMPRESSION:  Since 10/8/2022:  1.  Slightly decreased size of a pelvic abscess just anterior to the   rectum measuring 3.7 x 2.5 x 3.3 cm (previously 4.6 x 3.4 x 3.5 cm when   measured in a similar fashion).  2.  Postsurgical changes from prior appendectomy. No definite new   collection identified.  3.  Otherwise stable exam.  --- End of Report ---

## 2022-10-18 NOTE — ED PROVIDER NOTE - OBJECTIVE STATEMENT
53 y/o male with a PMH of perforated appendicitis with feculent peritonitis s/p laparoscopic appendectomy on 09/28 by Dr. Vu and hx of abdominal abscess s/p surgery presents to the ED for evaluation of rlq pain and dark urine that began last night. pt reports he was worried the abscess was worsening. pt denies fever, chills, chest pain, sob, recent trauma, rashes, back pain, burning or pain with urination, polyuria, blood in the urine or stool.

## 2022-10-18 NOTE — ED PROVIDER NOTE - ATTENDING APP SHARED VISIT CONTRIBUTION OF CARE
52-year-old male to ED with abdominal pain.  Patient status post appendicitis surgery.  Was seen here recently and diagnosed with small abscess patient was discharged home over the past 24 hours pain worsened so to ED for evaluation.  Exam as noted

## 2022-10-18 NOTE — ED PROVIDER NOTE - PHYSICAL EXAMINATION
Physical Exam    Vital Signs: I have reviewed the initial vital signs.  Constitutional: well-nourished, appears stated age, no acute distress  Eyes: Conjunctiva pink, Sclera clear  Cardiovascular: S1 and S2, regular rate, regular rhythm, well-perfused extremities, radial pulses equal and 2+ b/l.   Respiratory: unlabored respiratory effort, clear to auscultation bilaterally no wheezing, rales and rhonchi. pt is speaking full sentences. no accessory muscle use.   Gastrointestinal: soft, non-tender, nondistended abdomen, no pulsatile mass, normal bowl sounds, no rebound, no guarding  Musculoskeletal: FROM of b/l upper and lower extremities.   Integumentary: warm, dry, no rash  Neurologic: awake, alert, cranial nerves II-XII grossly intact, extremities’ motor and sensory functions grossly intact.   Psychiatric: appropriate mood, appropriate affect

## 2022-10-18 NOTE — ED PROVIDER NOTE - CARE PROVIDER_API CALL
Jacquelyn Mcdonald)  Surgery; Surgical Critical Care  [address] [place] [address], [address] [place] [address]  Phone: [phone]  Fax: [phone]  Scheduled Appointment: 10/24/2022

## 2022-10-18 NOTE — ED PROVIDER NOTE - CLINICAL SUMMARY MEDICAL DECISION MAKING FREE TEXT BOX
ED work-up with improving abscess decreased from 4.6 cm to 3.6 cm.  Labs without any evidence of acute process.  Patient feeling better discussed with surgery plan to discharge home.

## 2022-10-18 NOTE — ED PROVIDER NOTE - PROGRESS NOTE DETAILS
FF: spoke with surg, will consult FF: pt seen by surg and cleared for discharge. pt has an appt with dr. stover on monday and should continue antibiotics. pt advised of return precautions discussed at bedside. agreeable to dc. f/u with surg.

## 2022-10-18 NOTE — ED ADULT NURSE NOTE - BRAND OF COVID-19 VACCINATION
Emergency Department  [address] [place] [id number]  Phone:  [phone]  Fax:  [phone]                                    Juan Alberto Schultz   MRN: [id number]    Department:   Emergency Department   Date of Visit:  8/7/2020           After Visit Summary Signature Page    I have received my discharge instructions, and my questions have been answered. I have discussed any challenges I see with this plan with the nurse or doctor.    ..........................................................................................................................................  Patient/Patient Representative Signature      ..........................................................................................................................................  Patient Representative Print Name and Relationship to Patient    ..................................................               ................................................  Date                                   Time    ..........................................................................................................................................  Reviewed by Signature/Title    ...................................................              ..............................................  Date                                               Time          22EPIC Rev 08/18       
[employer] dose 1, 2, and 3

## 2022-10-18 NOTE — CHART NOTE - NSCHARTNOTEFT_GEN_A_CORE
This 53 y/o male who had perforated appendicitis with feculent peritonitis and underwent laparoscopic appendectomy on 09/28/2022.  Patient was readmitted on 10/08/2022 as he was complaining of some RUQ discomfort. He underwent CT that was consistent with Postoperative Pelvic Abscess.    Post-operative intraabdominal abscess was treated with iv antibiotic and improved at the time of discharge. He was sent home on po antibiotics and further follow up as outpatient.

## 2022-10-18 NOTE — CONSULT NOTE ADULT - ATTENDING COMMENTS
This is 52 year old male with PSH of laparoscopic appendectomy for perforated appendicitis and diffuse peritonitis (9/28/22) c/b pelvic abscess presents to the ED with RLQ pain. Pain started 24hours ago, intermittent and not aggravated by any triggers. Denies fevers/chills, nausea/vomiting. Tolerating PO diet. Patient still taking Levaquin and Flagyl. Patient reports having BM daily. Denies CP, SOB, palpitations, weakness/numbness, dysuria.   Patient has no abdominal pain at the time of my exam.    PE:  AAO x3  Chest: clear.  CV ; RRR  Abdomen: soft, nontender  Extr: no edema.    WBC 7  CT Abd/Pelvis was reviewed and shows no acute abdominal pathology. The known fluid collection in the pelvis is smaller.    Assessment:  51 y/o male with Nonspecific Abdominal Pain.  S/P Laparoscopic Appendectomy    PLAN:  Continue po antibiotics  Regular diet  Follow up as outpatient

## 2022-10-24 ENCOUNTER — APPOINTMENT (OUTPATIENT)
Dept: SURGERY | Facility: CLINIC | Age: 52
End: 2022-10-24

## 2022-10-24 VITALS
OXYGEN SATURATION: 99 % | TEMPERATURE: 97.2 F | HEIGHT: 69 IN | BODY MASS INDEX: 25.77 KG/M2 | WEIGHT: 174 LBS | DIASTOLIC BLOOD PRESSURE: 64 MMHG | SYSTOLIC BLOOD PRESSURE: 110 MMHG | HEART RATE: 83 BPM

## 2022-10-24 PROCEDURE — 99213 OFFICE O/P EST LOW 20 MIN: CPT | Mod: 24

## 2022-10-25 DIAGNOSIS — Z20.822 CONTACT WITH AND (SUSPECTED) EXPOSURE TO COVID-19: ICD-10-CM
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
## 2022-10-25 DIAGNOSIS — K65.1 PERITONEAL ABSCESS: ICD-10-CM

## 2022-10-25 DIAGNOSIS — T81.43XA INFECTION FOLLOWING A PROCEDURE, ORGAN AND SPACE SURGICAL SITE, INITIAL ENCOUNTER: ICD-10-CM

## 2022-11-02 NOTE — PLAN
[FreeTextEntry1] : Pathology report was reviewed and is consistent with acute appendicitis with gangrene - this was discussed with the patient in details.\par Regular diet.\par Avoid heavy lifting\par Patient will follow with me in 2 weeks to evaluate if ready to go back to work.

## 2022-11-02 NOTE — ASSESSMENT
[FreeTextEntry1] :  53 y/o male, S/P Laparoscopic Appendectomy on 09/27/2022 for gangrenous perforated appendicitis and diffuse peritonitis

## 2022-11-02 NOTE — HISTORY OF PRESENT ILLNESS
[de-identified] : This is 51 y/o male who had Laparoscopic Appendectomy on 09/27/2022 for gangrenous perforated appendicitis and diffuse peritonitis. [de-identified] : Doing well.\par Able to tolerate regular diet.\par Has regular bowel  movements.\par Completed antibiotic course.

## 2022-11-02 NOTE — PHYSICAL EXAM
[JVD] : no jugular venous distention  [Normal Thyroid] : the thyroid was normal [Normal Breath Sounds] : Normal breath sounds [Normal Heart Sounds] : normal heart sounds [Normal Rate and Rhythm] : normal rate and rhythm [Abdominal Masses] : No abdominal masses [Abdomen Tenderness] : ~T ~M No abdominal tenderness [Tender] : was nontender [Enlarged] : not enlarged [No Rash or Lesion] : No rash or lesion [Alert] : alert [Oriented to Person] : oriented to person [Oriented to Place] : oriented to place [Oriented to Time] : oriented to time [Calm] : calm [de-identified] : comfortable [de-identified] : VINNIE [de-identified] : supple [de-identified] : soft, nontender [de-identified] : wnl

## 2022-11-07 ENCOUNTER — APPOINTMENT (OUTPATIENT)
Dept: SURGERY | Facility: CLINIC | Age: 52
End: 2022-11-07

## 2022-11-07 VITALS
HEART RATE: 74 BPM | BODY MASS INDEX: 25.92 KG/M2 | HEIGHT: 69 IN | DIASTOLIC BLOOD PRESSURE: 84 MMHG | TEMPERATURE: 97 F | SYSTOLIC BLOOD PRESSURE: 124 MMHG | WEIGHT: 175 LBS | OXYGEN SATURATION: 98 %

## 2022-11-07 PROCEDURE — 99213 OFFICE O/P EST LOW 20 MIN: CPT | Mod: 24

## 2022-11-22 NOTE — PHYSICAL EXAM
[JVD] : no jugular venous distention  [Normal Thyroid] : the thyroid was normal [Normal Breath Sounds] : Normal breath sounds [Normal Heart Sounds] : normal heart sounds [Normal Rate and Rhythm] : normal rate and rhythm [Abdominal Masses] : No abdominal masses [Abdomen Tenderness] : ~T ~M No abdominal tenderness [Tender] : was nontender [Enlarged] : not enlarged [No Rash or Lesion] : No rash or lesion [Alert] : alert [Oriented to Person] : oriented to person [Oriented to Place] : oriented to place [Oriented to Time] : oriented to time [Calm] : calm [de-identified] : comfortable [de-identified] : VINNIE [de-identified] : supple [de-identified] : soft, nontender [de-identified] : wnl

## 2022-11-22 NOTE — HISTORY OF PRESENT ILLNESS
[de-identified] : Patient had laparoscopic appendectomy on 9/27/2022 for acute gangrenous appendicitis and diffuse peritonitis.\par Patient went to ED after discharge on 10/18/2022 due to abdominal discomfort. Had CT that showed that pelvic collection is  smaller (3cm x3cm) and he was discharged  home on po anti [de-identified] : Patient is doing well.\par No abdominal pain.\par Tolerates diet.\par

## 2022-11-22 NOTE — PLAN
[FreeTextEntry1] : Patient completed antibiotic course.\par Doing well.\par Follow up with me in 1-2 months.\par Patient is cleared to go back to work on 11/13/2022

## 2023-01-09 ENCOUNTER — APPOINTMENT (OUTPATIENT)
Dept: SURGERY | Facility: CLINIC | Age: 53
End: 2023-01-09

## 2023-04-12 ENCOUNTER — APPOINTMENT (OUTPATIENT)
Dept: NEUROLOGY | Facility: CLINIC | Age: 53
End: 2023-04-12
Payer: COMMERCIAL

## 2023-04-12 VITALS
TEMPERATURE: 97.4 F | HEART RATE: 60 BPM | DIASTOLIC BLOOD PRESSURE: 75 MMHG | BODY MASS INDEX: 25.92 KG/M2 | HEIGHT: 69 IN | WEIGHT: 175 LBS | SYSTOLIC BLOOD PRESSURE: 113 MMHG | OXYGEN SATURATION: 100 %

## 2023-04-12 DIAGNOSIS — G43.109 MIGRAINE WITH AURA, NOT INTRACTABLE, W/OUT STATUS MIGRAINOSUS: ICD-10-CM

## 2023-04-12 PROCEDURE — 99214 OFFICE O/P EST MOD 30 MIN: CPT

## 2023-04-12 NOTE — HISTORY OF PRESENT ILLNESS
[FreeTextEntry1] : Interval course: \par Using i headache kindra = triggers are vaping and alcohol intake. \par \par appendix removed in September\par \par 2-3 headaches per month. \par takes 1 hour for the Nurtec to work. \par \par \par \par HPI: Mr. Amos Antonio is referred today by Dr. Julio Hansen for evaluation and treatment of chronic migraine headaches.\par \par He first started having headaches in his late teens and while the headaches have fluctuated in frequency and intensity over the last 3+ decades and have remained persistent and severe in nature.  He has a strong family history with his mother and 1 sister experiencing severe migraines throughout the life.  He rarely gets an aura with visual scotomata that can occur with or without headache.  Amos reports that he has not been able  to discern specific food triggers in the past.  He does report that if he has neck pain it is often followed by a migraine and when he drinks either beer or wine, he has a migraine the next day.  He used to drink more frequently and to a greater degree in the past but now restricts it to 1 weekend day a week.  On those days, he tends to drink a sixpack of beer and/or a bottle of red wine.  The specific type of alcohol does not seem to matter vis-à-vis his triggers.  He does not drink coffee because it makes him jittery, but will have 1 cup of tea in the morning and that does not impact his headaches.  Finally stress at work and in his personal relationship were significant triggers that have subsequently improved in the last few months.\par \par As far as treatment, he used to take Tylenol or Advil and then more recently has been taking Excedrin tension.  Often he requires more than 1 dose every 6 hours before the headache resolves.  Headache duration can be anywhere from 1 hour to 12 hours.\par \sona Recently had an episode of vertigo that was resolved by an Epley maneuver by Dr. Ying.  An MRI IAC was also done that is negative for schwannoma, other mass lesions, inflammatory lesions, and strokes.  Of note, he does not usually get vertiginous with his migraines.\par \par Finally it appears that most of the neck pain occurs in the setting right before a migraine.\par \par \par Exercise and diet:\sona walks in the park usually 30 min a week. \par joined a gym recently but has not gone yet. 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 \par \par \par FH - mother and his oldest sister have migraines\par \par \par \par

## 2023-04-12 NOTE — ASSESSMENT
[FreeTextEntry1] : Nurtec has been working well with the patient and understands his triggers well and has already started cutting down his etoh use. \par \par Continue Nurtec\par Reduce triggers. \par \par \par

## 2023-11-09 ENCOUNTER — RX RENEWAL (OUTPATIENT)
Age: 53
End: 2023-11-09

## 2024-04-03 NOTE — PATIENT PROFILE ADULT - HEALTH LITERACY
If you have any problems or concerns, call our office at [phone].  If you think you are having a medical emergency, please call [phone].    If you have access to [employer] (the online patient portal), results from tests ordered at your visit (such as urine tests or ultrasounds) will appear in your portal as soon as they are ready. Please understand that you may see these results faster than we do. Please do not call about the results immediately - we will review your results and contact you after we have had time to analyze your tests.     Please also understand that we frequently do not receive messages sent to us by patients through [employer]. If you have questions or concerns, please call our office.   
Population Health Chart Review & Patient Outreach Details      Additional [facility] Health Notes:    Eye exam hyperlinked in        Updates Requested / Reviewed:      Updated Care Coordination Note, Care Everywhere, , Care Team Updated, and Immunizations Reconciliation Completed or Queried: [facility] Topics Overdue:      [facility] Score: 0     Patient is not due for any topics at this time.    Tetanus Vaccine  Shingles/Zoster Vaccine  RSV Vaccine                  Health Maintenance Topic(s) Outreach Outcomes & Actions Taken:    Eye Exam - Outreach Outcomes & Actions Taken  : Diabetic Eye External Records Uploaded, Care Team & History Updated if Applicable  
no

## 2024-04-12 ENCOUNTER — APPOINTMENT (OUTPATIENT)
Dept: NEUROLOGY | Facility: CLINIC | Age: 54
End: 2024-04-12
Payer: COMMERCIAL

## 2024-04-12 VITALS
DIASTOLIC BLOOD PRESSURE: 78 MMHG | SYSTOLIC BLOOD PRESSURE: 117 MMHG | BODY MASS INDEX: 26.66 KG/M2 | TEMPERATURE: 97.6 F | HEIGHT: 69 IN | OXYGEN SATURATION: 99 % | WEIGHT: 180 LBS | HEART RATE: 70 BPM

## 2024-04-12 DIAGNOSIS — G44.86 CERVICOGENIC HEADACHE: ICD-10-CM

## 2024-04-12 PROCEDURE — 99214 OFFICE O/P EST MOD 30 MIN: CPT

## 2024-04-12 RX ORDER — SODIUM SULFATE, POTASSIUM SULFATE, MAGNESIUM SULFATE 17.5; 3.13; 1.6 G/ML; G/ML; G/ML
17.5-3.13-1.6 SOLUTION, CONCENTRATE ORAL
Qty: 1 | Refills: 0 | Status: DISCONTINUED | COMMUNITY
Start: 2020-10-14 | End: 2024-04-12

## 2024-04-17 RX ORDER — RIMEGEPANT SULFATE 75 MG/75MG
75 TABLET, ORALLY DISINTEGRATING ORAL
Qty: 16 | Refills: 3 | Status: ACTIVE | COMMUNITY
Start: 2022-05-11

## 2024-04-23 ENCOUNTER — NON-APPOINTMENT (OUTPATIENT)
Age: 54
End: 2024-04-23

## 2024-05-02 PROBLEM — G44.86 CERVICOGENIC HEADACHE: Status: ACTIVE | Noted: 2022-05-11

## 2024-05-02 NOTE — PHYSICAL EXAM
[FreeTextEntry1] : The patient is alert and oriented x3, naming intact x3, repetition normal, follows three-step commands, and is able to participate fully in the history taking. Speech is normal with no evidence of dysarthria.   Cranial nerves II through XII intact  Motor exam: Upper and lower extremities 5 out of 5 power, normal tone. No abnormal movements noted.  Sensory exam: Intact to light touch   Gait: Normal stance and gait.

## 2024-05-02 NOTE — HISTORY OF PRESENT ILLNESS
[FreeTextEntry1] : Mr. ESCOBAR 53 year old male presents for a follow up visit for migraine headaches. Migraines are chronic. He is using PRN Nurtec and at his last visit reported cutting down on alcohol use. At such time was having about 3 migraines per month, and they respond to Nurtec.  No change in his headache features. Currently gets about 4-6 migraines per month, they are not debilitating, and they respond to Nurtec. Tried to quit vaping/drinking in February, he has since restarted which now in March had about 10 headaches. Also endorses that he did a poor job documenting headaches in February and he worked a lot of double shifts in March which he feels contributed to the headaches.

## 2024-05-02 NOTE — ASSESSMENT
[FreeTextEntry1] : Mr. ESCOBAR 53 year old male presents for a follow up visit regarding chronic migraine. He is currently on Nurtec PRN up to 10x per month. We discussed triggers today and the need to consider preventative treatment. He does not wish to change his medication regimen and would prefer to focus on triggers and re-evaluate at next visit.   Plan:  -C/w HA Diary -Control migraine triggers -C/w PRN Nurtec -May need to consider adding topamax QHS as preventive -RTC 6 mos

## 2024-07-10 ENCOUNTER — NON-APPOINTMENT (OUTPATIENT)
Age: 54
End: 2024-07-10

## 2024-10-07 ENCOUNTER — NON-APPOINTMENT (OUTPATIENT)
Age: 54
End: 2024-10-07

## 2024-10-07 ENCOUNTER — APPOINTMENT (OUTPATIENT)
Dept: NEUROLOGY | Facility: CLINIC | Age: 54
End: 2024-10-07
Payer: COMMERCIAL

## 2024-10-07 VITALS
WEIGHT: 177 LBS | HEIGHT: 69 IN | OXYGEN SATURATION: 100 % | HEART RATE: 73 BPM | TEMPERATURE: 97.5 F | BODY MASS INDEX: 26.22 KG/M2 | SYSTOLIC BLOOD PRESSURE: 120 MMHG | DIASTOLIC BLOOD PRESSURE: 77 MMHG

## 2024-10-07 DIAGNOSIS — G43.E09 CHRONIC MIGRAINE WITH AURA, NOT INTRACTABLE, WITHOUT STATUS MIGRAINOSUS: ICD-10-CM

## 2024-10-07 PROCEDURE — 99213 OFFICE O/P EST LOW 20 MIN: CPT

## 2024-12-16 ENCOUNTER — RX RENEWAL (OUTPATIENT)
Age: 54
End: 2024-12-16

## 2025-02-19 ENCOUNTER — APPOINTMENT (OUTPATIENT)
Dept: OPHTHALMOLOGY | Facility: CLINIC | Age: 55
End: 2025-02-19

## 2025-03-19 ENCOUNTER — NON-APPOINTMENT (OUTPATIENT)
Age: 55
End: 2025-03-19

## 2025-03-19 ENCOUNTER — APPOINTMENT (OUTPATIENT)
Dept: OPHTHALMOLOGY | Facility: CLINIC | Age: 55
End: 2025-03-19